# Patient Record
Sex: MALE | Race: WHITE | NOT HISPANIC OR LATINO | URBAN - METROPOLITAN AREA
[De-identification: names, ages, dates, MRNs, and addresses within clinical notes are randomized per-mention and may not be internally consistent; named-entity substitution may affect disease eponyms.]

---

## 2022-06-01 ENCOUNTER — TRANSFERRED RECORDS (OUTPATIENT)
Dept: HEALTH INFORMATION MANAGEMENT | Facility: CLINIC | Age: 52
End: 2022-06-01

## 2022-06-01 LAB
ALT SERPL-CCNC: 14 U/L (ref 6–55)
AST SERPL-CCNC: 11 U/L (ref 5–34)
INR (EXTERNAL): 1.12

## 2022-06-03 ENCOUNTER — TRANSFERRED RECORDS (OUTPATIENT)
Dept: HEALTH INFORMATION MANAGEMENT | Facility: CLINIC | Age: 52
End: 2022-06-03

## 2022-06-03 LAB
CREATININE (EXTERNAL): 1.05 MG/DL (ref 0.57–1.25)
GFR ESTIMATED (EXTERNAL): >60 ML/MIN/1.73M2
GFR ESTIMATED (IF AFRICAN AMERICAN) (EXTERNAL): >60 ML/MIN/1.73M2
GLUCOSE (EXTERNAL): 110 MG/DL (ref 70–105)
POTASSIUM (EXTERNAL): 4.2 MMOL/L (ref 3.5–5.1)

## 2023-02-12 ENCOUNTER — HEALTH MAINTENANCE LETTER (OUTPATIENT)
Age: 53
End: 2023-02-12

## 2023-02-14 ASSESSMENT — ENCOUNTER SYMPTOMS
LIGHT-HEADEDNESS: 0
JAUNDICE: 0
JOINT SWELLING: 0
HYPOTENSION: 0
BOWEL INCONTINENCE: 0
LEG PAIN: 0
BLOATING: 0
STIFFNESS: 1
PALPITATIONS: 0
PALPITATIONS: 0
POOR WOUND HEALING: 0
CONSTIPATION: 0
LIGHT-HEADEDNESS: 0
MYALGIAS: 0
NECK PAIN: 0
EXERCISE INTOLERANCE: 0
NECK PAIN: 0
BLOATING: 0
MUSCLE CRAMPS: 0
SYNCOPE: 0
LEG PAIN: 0
MUSCLE CRAMPS: 0
VOMITING: 0
ORTHOPNEA: 0
HEARTBURN: 1
BLOOD IN STOOL: 0
STIFFNESS: 1
JAUNDICE: 0
RECTAL PAIN: 0
EXERCISE INTOLERANCE: 0
NAIL CHANGES: 0
ARTHRALGIAS: 0
MYALGIAS: 0
DIARRHEA: 0
HYPERTENSION: 1
POOR WOUND HEALING: 0
BACK PAIN: 0
NECK PAIN: 0
SLEEP DISTURBANCES DUE TO BREATHING: 0
NAUSEA: 0
VOMITING: 0
MYALGIAS: 0
JOINT SWELLING: 0
BLOOD IN STOOL: 0
ORTHOPNEA: 0
ARTHRALGIAS: 0
NAUSEA: 0
LEG PAIN: 0
EXERCISE INTOLERANCE: 0
SLEEP DISTURBANCES DUE TO BREATHING: 0
DIARRHEA: 0
NAIL CHANGES: 0
SKIN CHANGES: 0
CONSTIPATION: 0
SYNCOPE: 0
ABDOMINAL PAIN: 0
LIGHT-HEADEDNESS: 0
BLOOD IN STOOL: 0
POOR WOUND HEALING: 0
SYNCOPE: 0
HYPOTENSION: 0
HEARTBURN: 1
BOWEL INCONTINENCE: 0
STIFFNESS: 1
JOINT SWELLING: 0
ABDOMINAL PAIN: 0
DIARRHEA: 0
SLEEP DISTURBANCES DUE TO BREATHING: 0
HEARTBURN: 1
ORTHOPNEA: 0
MUSCLE WEAKNESS: 0
HYPERTENSION: 1
JAUNDICE: 0
RECTAL PAIN: 0
BLOATING: 0
ABDOMINAL PAIN: 0
BOWEL INCONTINENCE: 0
MUSCLE CRAMPS: 0
NAUSEA: 0
HYPERTENSION: 1
HYPOTENSION: 0
CONSTIPATION: 0
BACK PAIN: 0
SKIN CHANGES: 0
MUSCLE WEAKNESS: 0
MUSCLE WEAKNESS: 0
SKIN CHANGES: 0
ARTHRALGIAS: 0
RECTAL PAIN: 0
VOMITING: 0
PALPITATIONS: 0
BACK PAIN: 0
NAIL CHANGES: 0

## 2023-02-14 ASSESSMENT — ANXIETY QUESTIONNAIRES
1. FEELING NERVOUS, ANXIOUS, OR ON EDGE: NOT AT ALL
7. FEELING AFRAID AS IF SOMETHING AWFUL MIGHT HAPPEN: NOT AT ALL
GAD7 TOTAL SCORE: 1
7. FEELING AFRAID AS IF SOMETHING AWFUL MIGHT HAPPEN: NOT AT ALL
2. NOT BEING ABLE TO STOP OR CONTROL WORRYING: NOT AT ALL
4. TROUBLE RELAXING: NOT AT ALL
IF YOU CHECKED OFF ANY PROBLEMS ON THIS QUESTIONNAIRE, HOW DIFFICULT HAVE THESE PROBLEMS MADE IT FOR YOU TO DO YOUR WORK, TAKE CARE OF THINGS AT HOME, OR GET ALONG WITH OTHER PEOPLE: NOT DIFFICULT AT ALL
6. BECOMING EASILY ANNOYED OR IRRITABLE: NOT AT ALL
GAD7 TOTAL SCORE: 1
2. NOT BEING ABLE TO STOP OR CONTROL WORRYING: NOT AT ALL
4. TROUBLE RELAXING: NOT AT ALL
6. BECOMING EASILY ANNOYED OR IRRITABLE: NOT AT ALL
5. BEING SO RESTLESS THAT IT IS HARD TO SIT STILL: NOT AT ALL
GAD7 TOTAL SCORE: 1
8. IF YOU CHECKED OFF ANY PROBLEMS, HOW DIFFICULT HAVE THESE MADE IT FOR YOU TO DO YOUR WORK, TAKE CARE OF THINGS AT HOME, OR GET ALONG WITH OTHER PEOPLE?: NOT DIFFICULT AT ALL
1. FEELING NERVOUS, ANXIOUS, OR ON EDGE: NOT AT ALL
8. IF YOU CHECKED OFF ANY PROBLEMS, HOW DIFFICULT HAVE THESE MADE IT FOR YOU TO DO YOUR WORK, TAKE CARE OF THINGS AT HOME, OR GET ALONG WITH OTHER PEOPLE?: NOT DIFFICULT AT ALL
3. WORRYING TOO MUCH ABOUT DIFFERENT THINGS: SEVERAL DAYS
7. FEELING AFRAID AS IF SOMETHING AWFUL MIGHT HAPPEN: NOT AT ALL
7. FEELING AFRAID AS IF SOMETHING AWFUL MIGHT HAPPEN: NOT AT ALL
IF YOU CHECKED OFF ANY PROBLEMS ON THIS QUESTIONNAIRE, HOW DIFFICULT HAVE THESE PROBLEMS MADE IT FOR YOU TO DO YOUR WORK, TAKE CARE OF THINGS AT HOME, OR GET ALONG WITH OTHER PEOPLE: NOT DIFFICULT AT ALL
5. BEING SO RESTLESS THAT IT IS HARD TO SIT STILL: NOT AT ALL
GAD7 TOTAL SCORE: 1
3. WORRYING TOO MUCH ABOUT DIFFERENT THINGS: SEVERAL DAYS

## 2023-02-16 ENCOUNTER — VIRTUAL VISIT (OUTPATIENT)
Dept: INTERNAL MEDICINE | Facility: CLINIC | Age: 53
End: 2023-02-16
Payer: COMMERCIAL

## 2023-02-16 DIAGNOSIS — Z00.00 ENCOUNTER FOR PREVENTATIVE ADULT HEALTH CARE EXAMINATION: Primary | ICD-10-CM

## 2023-02-16 PROCEDURE — 99207 PR NO CHARGE LOS: CPT

## 2023-02-16 RX ORDER — ALLOPURINOL 100 MG/1
100 TABLET ORAL DAILY
COMMUNITY

## 2023-02-16 NOTE — PROGRESS NOTES
Health Maintenance:  Do you have a PCP? No  When was your last visit with your PCP?   When was your last eye exam? 5/2022  Have you ever had a colonoscopy? No  If yes, when?   Have you ever had any polyps removed?     As part of your visit we will set up a DEXA scan which will measure your body composition. We have a few questions that need to be answered before we can schedule this scan:   What is your approximate weight? 240   Have you ever had a DEXA scan within the past 2 years? No   Will you have any other imaging studies with contrast (x-ray, CT scan) within 7 days of this appointment? No   Have you had any spine or hip surgery? No   Do you take any vitamins that contain calcium or antacids with calcium? No    If yes, stop taking 24 hours prior to visit.     Goals for the Visit:  1. Thorough Comprehensive Preventive Exam  2. BP concerns  3.   Pertinent past Medical/Family and Social HX:   Pertinent sx that desire are addressed with this visit:     Answers for HPI/ROS submitted by the patient on 2/14/2023  ELIZABETH 7 TOTAL SCORE: 1  General Symptoms: No  Skin Symptoms: Yes  HENT Symptoms: No  EYE SYMPTOMS: No  HEART SYMPTOMS: Yes  LUNG SYMPTOMS: No  INTESTINAL SYMPTOMS: Yes  URINARY SYMPTOMS: No  REPRODUCTIVE SYMPTOMS: No  SKELETAL SYMPTOMS: Yes  BLOOD SYMPTOMS: No  NERVOUS SYSTEM SYMPTOMS: No  MENTAL HEALTH SYMPTOMS: No  Changes in hair: No  Changes in moles/birth marks: No  Itching: Yes  Rashes: No  Changes in nails: No  Acne: No  Change in facial hair: No  Warts: No  Non-healing sores: No  Scarring: No  Flaking of skin: No  Color changes of hands/feet in cold : No  Sun sensitivity: No  Skin thickening: No  Chest pain or pressure: No  Fast or irregular heartbeat: No  Pain in legs with walking: No  Trouble breathing while lying down: No  Fingers or toes appear blue: No  High blood pressure: Yes  Low blood pressure: No  Fainting: No  Murmurs: No  Pacemaker: No  Varicose veins: No  Edema or swelling: No  Wake up at  night with shortness of breath: No  Light-headedness: No  Exercise intolerance: No  Heart burn or indigestion: Yes  Nausea: No  Vomiting: No  Abdominal pain: No  Bloating: No  Constipation: No  Diarrhea: No  Blood in stool: No  Black stools: No  Rectal or Anal pain: No  Fecal incontinence: No  Yellowing of skin or eyes: No  Vomit with blood: No  Change in stools: No  Back pain: No  Muscle aches: No  Neck pain: No  Swollen joints: No  Joint pain: No  Bone pain: No  Muscle cramps: No  Muscle weakness: No  Joint stiffness: Yes  Bone fracture: No        Instructions prior to appointment:   1. Fast beginning at 10 pm for lab appointment  2. If your preventive care assessment package includes a Fitness Assessment, please bring athletic shoes. Complementary [facility] fitness attire is provided and yours to keep.  3. If eye exam, eyes may be dilated, it will last 4-6 hours, may want to bring sunglasses.   4. May bring laptop or other work materials for use during downtime.   5. You will receive an email about 3 days prior to your visit with a final itinerary, menu selections for the complementary breakfast and lunch and instructions for the visit.     Complimentary  Parking provided. Drop off car in front of [facility], take the patient elevators to the [facility]. When you enter in the lobby, identify yourself as an [facility] [atient and you will be escorted up to the clinic.   If questions arise prior to your appointment please contact the clinic at [phone].

## 2023-02-20 ENCOUNTER — ANCILLARY PROCEDURE (OUTPATIENT)
Dept: BONE DENSITY | Facility: CLINIC | Age: 53
End: 2023-02-20

## 2023-02-20 ENCOUNTER — OFFICE VISIT (OUTPATIENT)
Dept: DERMATOLOGY | Facility: CLINIC | Age: 53
End: 2023-02-20

## 2023-02-20 ENCOUNTER — OFFICE VISIT (OUTPATIENT)
Dept: INTERNAL MEDICINE | Facility: CLINIC | Age: 53
End: 2023-02-20

## 2023-02-20 ENCOUNTER — OFFICE VISIT (OUTPATIENT)
Dept: GASTROENTEROLOGY | Facility: CLINIC | Age: 53
End: 2023-02-20

## 2023-02-20 ENCOUNTER — OFFICE VISIT (OUTPATIENT)
Dept: AUDIOLOGY | Facility: CLINIC | Age: 53
End: 2023-02-20

## 2023-02-20 ENCOUNTER — APPOINTMENT (OUTPATIENT)
Dept: INTERNAL MEDICINE | Facility: CLINIC | Age: 53
End: 2023-02-20

## 2023-02-20 ENCOUNTER — OFFICE VISIT (OUTPATIENT)
Dept: OPHTHALMOLOGY | Facility: CLINIC | Age: 53
End: 2023-02-20

## 2023-02-20 VITALS
HEIGHT: 76 IN | OXYGEN SATURATION: 97 % | SYSTOLIC BLOOD PRESSURE: 146 MMHG | DIASTOLIC BLOOD PRESSURE: 98 MMHG | RESPIRATION RATE: 16 BRPM | WEIGHT: 246 LBS | HEART RATE: 72 BPM | BODY MASS INDEX: 29.96 KG/M2 | TEMPERATURE: 98.4 F

## 2023-02-20 DIAGNOSIS — L73.8 SENILE SEBACEOUS GLAND HYPERPLASIA: ICD-10-CM

## 2023-02-20 DIAGNOSIS — H52.4 PRESBYOPIA: ICD-10-CM

## 2023-02-20 DIAGNOSIS — R07.9 CHEST PAIN, UNSPECIFIED TYPE: ICD-10-CM

## 2023-02-20 DIAGNOSIS — R82.90 ABNORMAL FINDING ON URINALYSIS: ICD-10-CM

## 2023-02-20 DIAGNOSIS — Z00.00 ENCOUNTER FOR PREVENTATIVE ADULT HEALTH CARE EXAMINATION: ICD-10-CM

## 2023-02-20 DIAGNOSIS — Z71.82 EXERCISE COUNSELING: Primary | ICD-10-CM

## 2023-02-20 DIAGNOSIS — E78.5 DYSLIPIDEMIA: ICD-10-CM

## 2023-02-20 DIAGNOSIS — R73.01 IMPAIRED FASTING GLUCOSE: ICD-10-CM

## 2023-02-20 DIAGNOSIS — R03.0 ELEVATED BLOOD PRESSURE READING IN OFFICE WITHOUT DIAGNOSIS OF HYPERTENSION: ICD-10-CM

## 2023-02-20 DIAGNOSIS — H52.13 MYOPIA OF BOTH EYES: Primary | ICD-10-CM

## 2023-02-20 DIAGNOSIS — E79.0 HYPERURICEMIA: ICD-10-CM

## 2023-02-20 DIAGNOSIS — D18.01 CHERRY ANGIOMA: Primary | ICD-10-CM

## 2023-02-20 DIAGNOSIS — J98.8 NARROWING OF AIRWAY: ICD-10-CM

## 2023-02-20 DIAGNOSIS — E66.3 OVERWEIGHT: ICD-10-CM

## 2023-02-20 DIAGNOSIS — Z00.00 ENCOUNTER FOR PREVENTATIVE ADULT HEALTH CARE EXAMINATION: Primary | ICD-10-CM

## 2023-02-20 DIAGNOSIS — G44.209 TENSION HEADACHE: ICD-10-CM

## 2023-02-20 DIAGNOSIS — L23.5 ALLERGIC DERMATITIS DUE TO OTHER CHEMICAL PRODUCT: ICD-10-CM

## 2023-02-20 LAB
ALBUMIN MFR UR ELPH: 13.8 MG/DL (ref 1–14)
ALBUMIN UR-MCNC: NEGATIVE MG/DL
ALP SERPL-CCNC: 77 U/L (ref 40–129)
ALT SERPL W P-5'-P-CCNC: 44 U/L (ref 10–50)
APPEARANCE UR: CLEAR
BASOPHILS # BLD AUTO: 0.1 10E3/UL (ref 0–0.2)
BASOPHILS NFR BLD AUTO: 1 %
BILIRUB UR QL STRIP: NEGATIVE
CHOLEST SERPL-MCNC: 205 MG/DL
COLOR UR AUTO: YELLOW
CREAT SERPL-MCNC: 1.25 MG/DL (ref 0.67–1.17)
CREAT UR-MCNC: 262 MG/DL
CYSTATIN C (ROCHE): 1.1 MG/L (ref 0.6–1)
DEPRECATED CALCIDIOL+CALCIFEROL SERPL-MC: 20 UG/L (ref 20–75)
EOSINOPHIL # BLD AUTO: 0.3 10E3/UL (ref 0–0.7)
EOSINOPHIL NFR BLD AUTO: 5 %
ERYTHROCYTE [DISTWIDTH] IN BLOOD BY AUTOMATED COUNT: 12.6 % (ref 10–15)
ERYTHROCYTE [SEDIMENTATION RATE] IN BLOOD BY WESTERGREN METHOD: 9 MM/HR (ref 0–20)
FASTING STATUS PATIENT QL REPORTED: YES
GFR SERPL CREATININE-BSD FRML MDRD: 70 ML/MIN/1.73M2
GFR SERPL CREATININE-BSD FRML MDRD: 71 ML/MIN/1.73M2
GLUCOSE SERPL-MCNC: 108 MG/DL (ref 70–99)
GLUCOSE UR STRIP-MCNC: NEGATIVE MG/DL
HCT VFR BLD AUTO: 44.5 % (ref 40–53)
HCV AB SERPL QL IA: NONREACTIVE
HDLC SERPL-MCNC: 51 MG/DL
HGB BLD-MCNC: 14.7 G/DL (ref 13.3–17.7)
HGB UR QL STRIP: ABNORMAL
HIV 1+2 AB+HIV1 P24 AG SERPL QL IA: NONREACTIVE
HOLD SPECIMEN: NORMAL
HOLD SPECIMEN: NORMAL
IMM GRANULOCYTES # BLD: 0 10E3/UL
IMM GRANULOCYTES NFR BLD: 0 %
KETONES UR STRIP-MCNC: NEGATIVE MG/DL
LDLC SERPL CALC-MCNC: 134 MG/DL
LEUKOCYTE ESTERASE UR QL STRIP: NEGATIVE
LYMPHOCYTES # BLD AUTO: 1.8 10E3/UL (ref 0.8–5.3)
LYMPHOCYTES NFR BLD AUTO: 28 %
MCH RBC QN AUTO: 29.5 PG (ref 26.5–33)
MCHC RBC AUTO-ENTMCNC: 33 G/DL (ref 31.5–36.5)
MCV RBC AUTO: 89 FL (ref 78–100)
MONOCYTES # BLD AUTO: 0.5 10E3/UL (ref 0–1.3)
MONOCYTES NFR BLD AUTO: 7 %
MUCOUS THREADS #/AREA URNS LPF: PRESENT /LPF
NEUTROPHILS # BLD AUTO: 3.9 10E3/UL (ref 1.6–8.3)
NEUTROPHILS NFR BLD AUTO: 59 %
NITRATE UR QL: NEGATIVE
NONHDLC SERPL-MCNC: 154 MG/DL
NRBC # BLD AUTO: 0 10E3/UL
NRBC BLD AUTO-RTO: 0 /100
PH UR STRIP: 5.5 [PH] (ref 5–7)
PLATELET # BLD AUTO: 248 10E3/UL (ref 150–450)
PROT/CREAT 24H UR: 0.05 MG/MG CR (ref 0–0.2)
PSA SERPL-MCNC: 0.65 NG/ML (ref 0–3.5)
RBC # BLD AUTO: 4.99 10E6/UL (ref 4.4–5.9)
RBC URINE: 2 /HPF
SP GR UR STRIP: 1.03 (ref 1–1.03)
TRIGL SERPL-MCNC: 102 MG/DL
TSH SERPL DL<=0.005 MIU/L-ACNC: 3.51 UIU/ML (ref 0.3–4.2)
URATE SERPL-MCNC: 9.5 MG/DL (ref 3.4–7)
UROBILINOGEN UR STRIP-MCNC: NORMAL MG/DL
WBC # BLD AUTO: 6.6 10E3/UL (ref 4–11)
WBC URINE: <1 /HPF

## 2023-02-20 PROCEDURE — 99207 PR NO CHARGE LOS: CPT

## 2023-02-20 PROCEDURE — 92550 TYMPANOMETRY & REFLEX THRESH: CPT | Performed by: AUDIOLOGIST

## 2023-02-20 PROCEDURE — 92015 DETERMINE REFRACTIVE STATE: CPT | Performed by: OPHTHALMOLOGY

## 2023-02-20 PROCEDURE — G0103 PSA SCREENING: HCPCS | Performed by: PATHOLOGY

## 2023-02-20 PROCEDURE — 96999 UNLISTED SPEC DERM SVC/PX: CPT | Performed by: INTERNAL MEDICINE

## 2023-02-20 PROCEDURE — 86803 HEPATITIS C AB TEST: CPT | Mod: 90 | Performed by: PATHOLOGY

## 2023-02-20 PROCEDURE — 84156 ASSAY OF PROTEIN URINE: CPT | Mod: 90 | Performed by: PATHOLOGY

## 2023-02-20 PROCEDURE — 99000 SPECIMEN HANDLING OFFICE-LAB: CPT | Performed by: PATHOLOGY

## 2023-02-20 PROCEDURE — 92004 COMPRE OPH EXAM NEW PT 1/>: CPT | Performed by: OPHTHALMOLOGY

## 2023-02-20 PROCEDURE — 87389 HIV-1 AG W/HIV-1&-2 AB AG IA: CPT | Mod: 90 | Performed by: PATHOLOGY

## 2023-02-20 PROCEDURE — 77080 DXA BONE DENSITY AXIAL: CPT | Performed by: INTERNAL MEDICINE

## 2023-02-20 PROCEDURE — 36415 COLL VENOUS BLD VENIPUNCTURE: CPT | Performed by: PATHOLOGY

## 2023-02-20 PROCEDURE — 94375 RESPIRATORY FLOW VOLUME LOOP: CPT | Performed by: INTERNAL MEDICINE

## 2023-02-20 PROCEDURE — 90471 IMMUNIZATION ADMIN: CPT | Performed by: INTERNAL MEDICINE

## 2023-02-20 PROCEDURE — 99207 PR NO BILLABLE SERVICE THIS VISIT: CPT | Performed by: DIETITIAN, REGISTERED

## 2023-02-20 PROCEDURE — 99203 OFFICE O/P NEW LOW 30 MIN: CPT | Performed by: DERMATOLOGY

## 2023-02-20 PROCEDURE — 84075 ASSAY ALKALINE PHOSPHATASE: CPT | Performed by: PATHOLOGY

## 2023-02-20 PROCEDURE — 82565 ASSAY OF CREATININE: CPT | Performed by: PATHOLOGY

## 2023-02-20 PROCEDURE — 81001 URINALYSIS AUTO W/SCOPE: CPT | Performed by: PATHOLOGY

## 2023-02-20 PROCEDURE — 84550 ASSAY OF BLOOD/URIC ACID: CPT | Mod: 90 | Performed by: PATHOLOGY

## 2023-02-20 PROCEDURE — 86706 HEP B SURFACE ANTIBODY: CPT | Mod: 90 | Performed by: PATHOLOGY

## 2023-02-20 PROCEDURE — 87340 HEPATITIS B SURFACE AG IA: CPT | Mod: 90 | Performed by: PATHOLOGY

## 2023-02-20 PROCEDURE — 84443 ASSAY THYROID STIM HORMONE: CPT | Performed by: PATHOLOGY

## 2023-02-20 PROCEDURE — 85025 COMPLETE CBC W/AUTO DIFF WBC: CPT | Performed by: PATHOLOGY

## 2023-02-20 PROCEDURE — 99386 PREV VISIT NEW AGE 40-64: CPT | Mod: 25 | Performed by: INTERNAL MEDICINE

## 2023-02-20 PROCEDURE — 85652 RBC SED RATE AUTOMATED: CPT | Performed by: PATHOLOGY

## 2023-02-20 PROCEDURE — 82306 VITAMIN D 25 HYDROXY: CPT | Mod: 90 | Performed by: PATHOLOGY

## 2023-02-20 PROCEDURE — 84460 ALANINE AMINO (ALT) (SGPT): CPT | Performed by: PATHOLOGY

## 2023-02-20 PROCEDURE — 82947 ASSAY GLUCOSE BLOOD QUANT: CPT | Performed by: PATHOLOGY

## 2023-02-20 PROCEDURE — 84520 ASSAY OF UREA NITROGEN: CPT | Mod: 90 | Performed by: PATHOLOGY

## 2023-02-20 PROCEDURE — 82610 CYSTATIN C: CPT | Mod: 90 | Performed by: PATHOLOGY

## 2023-02-20 PROCEDURE — 90715 TDAP VACCINE 7 YRS/> IM: CPT | Performed by: INTERNAL MEDICINE

## 2023-02-20 PROCEDURE — 93010 ELECTROCARDIOGRAM REPORT: CPT | Performed by: INTERNAL MEDICINE

## 2023-02-20 PROCEDURE — 92557 COMPREHENSIVE HEARING TEST: CPT | Performed by: AUDIOLOGIST

## 2023-02-20 PROCEDURE — 80061 LIPID PANEL: CPT | Performed by: PATHOLOGY

## 2023-02-20 RX ORDER — TRIAMCINOLONE ACETONIDE 1 MG/G
OINTMENT TOPICAL 2 TIMES DAILY
Qty: 80 G | Refills: 3 | Status: SHIPPED | OUTPATIENT
Start: 2023-02-20

## 2023-02-20 ASSESSMENT — REFRACTION_WEARINGRX
OD_AXIS: 107
OS_SPHERE: -0.75
SPECS_TYPE: SVL
OS_CYLINDER: SPHERE
OD_CYLINDER: +0.25
OD_SPHERE: -1.50

## 2023-02-20 ASSESSMENT — SLIT LAMP EXAM - LIDS
COMMENTS: NORMAL
COMMENTS: NORMAL

## 2023-02-20 ASSESSMENT — CONF VISUAL FIELD
METHOD: COUNTING FINGERS
OS_SUPERIOR_TEMPORAL_RESTRICTION: 0
OS_INFERIOR_TEMPORAL_RESTRICTION: 0
OS_SUPERIOR_NASAL_RESTRICTION: 0
OS_INFERIOR_NASAL_RESTRICTION: 0
OS_NORMAL: 1
OD_NORMAL: 1
OD_SUPERIOR_TEMPORAL_RESTRICTION: 0
OD_SUPERIOR_NASAL_RESTRICTION: 0
OD_INFERIOR_NASAL_RESTRICTION: 0
OD_INFERIOR_TEMPORAL_RESTRICTION: 0

## 2023-02-20 ASSESSMENT — CUP TO DISC RATIO
OS_RATIO: 0.2
OD_RATIO: 0.2

## 2023-02-20 ASSESSMENT — TONOMETRY
OS_IOP_MMHG: 14
OD_IOP_MMHG: 17
IOP_METHOD: TONOPEN

## 2023-02-20 ASSESSMENT — EXTERNAL EXAM - RIGHT EYE: OD_EXAM: NORMAL

## 2023-02-20 ASSESSMENT — REFRACTION_MANIFEST
OD_ADD: +1.75
OS_CYLINDER: SPHERE
OD_SPHERE: -1.75
OS_ADD: +1.75
OS_SPHERE: -1.00
OD_AXIS: 077
OD_CYLINDER: +0.50

## 2023-02-20 ASSESSMENT — VISUAL ACUITY
OS_CC+: -2
METHOD: SNELLEN - LINEAR
CORRECTION_TYPE: GLASSES
OD_CC: 20/20
OS_CC: 20/20

## 2023-02-20 ASSESSMENT — EXTERNAL EXAM - LEFT EYE: OS_EXAM: NORMAL

## 2023-02-20 ASSESSMENT — PAIN SCALES - GENERAL: PAINLEVEL: NO PAIN (0)

## 2023-02-20 NOTE — NURSING NOTE
Chief Complaint   Patient presents with     Physical     Patient is here for annual physical     Marta Hung CMA 7:15 AM on 2/20/2023.

## 2023-02-20 NOTE — PROGRESS NOTES
[facility] Dermatology Note    Encounter Date: Feb 20, 2023    Dermatology Problem List:  1. Suspected ACD, possibly to rubber components in gloves: triamcinolone, dermatoallergology referral  ______________________________________    Impression/Plan:  1. Suspected allergic contact dermatitis, possibly to rubber fingertips in gloves: will start with topicals. We discussed further diagnostic workup with patch testing and will refer to dermatoallergology.  - triamcinolone  - derm-allergy referral    2. Reassurance provided for benign lesions not treated today including cherry angiomata and sebaceous hyperplasia.        Follow-up in 1 year.       Staff Involved:  Staff Only    Adolfo Mckeon MD   of Dermatology  Department of Dermatology  [facility]      CC:   Chief Complaint   Patient presents with     Skin Check     FBSE.       History of Present Illness:  Mr. Zack Gleason is a 52 year old male who presents as a new patient.    Reaction to gloves - with rubber tips - some swelling of fingertip - fingers itch  - goes away after nancy hands    Labs:  N/A    Physical exam:  Vitals: There were no vitals taken for this visit.  GEN: This is a well developed, well-nourished male in no acute distress, in a pleasant mood.    SKIN: Stearns phototype II  - Full skin, which includes the head/face, both arms, chest, back, abdomen,both legs, buttocks, digits and/or nails, was examined.  - There are dome shaped bright red papules on the head/neck, trunk, extremities.   - There are yellow oily papules with central umbilication located on the face  - no erythema on the hands at present  - No other lesions of concern on areas examined.     Past Medical History:   Past Medical History:   Diagnosis Date     Anxiety approximately 5 years ago    I've had 2 panic attacks in the past.  The last one occurring about 5 years ago.     Gastroesophageal reflux  disease about 10 years ago    Recently had gall bladder taken out.  Haven't had acid reflux since.  Not sure if that's related or just a coincidence.     Hyperlipidemia 2021    Was put on medicine for 30 days, but never renewed the prescription.     Hypertension 4 years ago    Every time I go to a doctor's appointment I've been told my blood pressure is elevated. Never enough to put me on medicine though.     Past Surgical History:   Procedure Laterality Date     CHOLECYSTECTOMY  6/2022       Social History:   reports that he has never smoked. He has never used smokeless tobacco. He reports current alcohol use. He reports that he does not use drugs.    Family History:  Family History   Problem Relation Age of Onset     Influenza/Pneumonia Father      Heart Disease Maternal Grandfather      Dementia Paternal Grandmother      Cancer Paternal Grandfather      Lung Cancer Paternal Grandfather      Glaucoma No family hx of      Macular Degeneration No family hx of        Medications:  Current Outpatient Medications   Medication Sig Dispense Refill     allopurinol (ZYLOPRIM) 100 MG tablet Take 100 mg by mouth daily       No Known Allergies

## 2023-02-20 NOTE — LETTER
2/20/2023       RE: Zack Gleason  [address] [place] [address]     Dear Colleague,    Thank you for referring your patient, Zack Gleason, to the [facility] [place] at [facility]. Please see a copy of my visit note below.    [facility] Dermatology Note    Encounter Date: Feb 20, 2023    Dermatology Problem List:  1. Suspected ACD, possibly to rubber components in gloves: triamcinolone, dermatoallergology referral  ______________________________________    Impression/Plan:  1. Suspected allergic contact dermatitis, possibly to rubber fingertips in gloves: will start with topicals. We discussed further diagnostic workup with patch testing and will refer to dermatoallergology.  - triamcinolone  - derm-allergy referral    2. Reassurance provided for benign lesions not treated today including cherry angiomata and sebaceous hyperplasia.        Follow-up in 1 year.       Staff Involved:  Staff Only    Adolfo Mckeon MD   of Dermatology  Department of Dermatology  [facility]      CC:   Chief Complaint   Patient presents with     Skin Check     FBSE.       History of Present Illness:  Mr. Zack Gleason is a 52 year old male who presents as a new patient.    Reaction to gloves - with rubber tips - some swelling of fingertip - fingers itch  - goes away after nancy hands    Labs:  N/A    Physical exam:  Vitals: There were no vitals taken for this visit.  GEN: This is a well developed, well-nourished male in no acute distress, in a pleasant mood.    SKIN: Stearns phototype II  - Full skin, which includes the head/face, both arms, chest, back, abdomen,both legs, buttocks, digits and/or nails, was examined.  - There are dome shaped bright red papules on the head/neck, trunk, extremities.   - There are yellow oily papules with central umbilication located on the  face  - no erythema on the hands at present  - No other lesions of concern on areas examined.     Past Medical History:   Past Medical History:   Diagnosis Date     Anxiety approximately 5 years ago    I've had 2 panic attacks in the past.  The last one occurring about 5 years ago.     Gastroesophageal reflux disease about 10 years ago    Recently had gall bladder taken out.  Haven't had acid reflux since.  Not sure if that's related or just a coincidence.     Hyperlipidemia 2021    Was put on medicine for 30 days, but never renewed the prescription.     Hypertension 4 years ago    Every time I go to a doctor's appointment I've been told my blood pressure is elevated. Never enough to put me on medicine though.     Past Surgical History:   Procedure Laterality Date     CHOLECYSTECTOMY  6/2022       Social History:   reports that he has never smoked. He has never used smokeless tobacco. He reports current alcohol use. He reports that he does not use drugs.    Family History:  Family History   Problem Relation Age of Onset     Influenza/Pneumonia Father      Heart Disease Maternal Grandfather      Dementia Paternal Grandmother      Cancer Paternal Grandfather      Lung Cancer Paternal Grandfather      Glaucoma No family hx of      Macular Degeneration No family hx of        Medications:  Current Outpatient Medications   Medication Sig Dispense Refill     allopurinol (ZYLOPRIM) 100 MG tablet Take 100 mg by mouth daily       No Known Allergies

## 2023-02-20 NOTE — NURSING NOTE
Dermatology Rooming Note    Zack Gleason's goals for this visit include:   Chief Complaint   Patient presents with     Skin Check     FBSE.     Harshad Singer, BENY-B

## 2023-02-20 NOTE — PROGRESS NOTES
Zack DEANDRE Gleason comes into clinic today at the request of Dr. OMAR Sotomayor Ordering Provider for EKG.    This service provided today was under the supervising provider of the day Dr. OMAR Sotomayor, who was available if needed.    Marta Hung, [facility]

## 2023-02-20 NOTE — PATIENT INSTRUCTIONS
It was nice meeting you today. Below are the nutrition recommendations we discussed at your visit.    Please let me know if you have any additional questions.    Nutrition Recommendations    1. Can use the Plate method plan for general guidance on getting balanced meals and general portion sizes which is as follows:   Make half of your plate vegetables and fruit. (Aim for eating at least 1-2 cups of vegetables at meals at least and can have additional vegetables servings and fruit too).     Make 1/4 of your plate lean protein sources at a meal (salmon/fish, skinless chicken/turkey breast, pork loin, lean cuts of beef, black or kidney or ladd beans, tofu, edamame, tempeh, eggs, egg whites, lowfat cottage cheese, lowfat yogurt).     Make the other 1/4 of your plate whole grain starches/grains/starchy vegetables at meals. Some examples include quinoa, brown rice, wild rice, barley, whole grain tortilla or starchy vegetables (potatoes, sweet potatoes, winter squash, peas, corn).     Include some healthy/unsaturated fat servings at a meal (avocados, nut butters, nuts/seeds, olive oil, vegetable oils, flaxseed, rosa seeds).     *Note: Recommendation is to eat at least 2-3 serving per day of some good sources of calcium (such as lowfat cottage cheese, lowfat yogurt, lowfat milk, tofu, salmon, sardines, kale, spinach, soybeans, almonds or whey protein powder for example).    2. Planning out meals ahead of time and preparing some meals ahead of time can help you stick to eating more balanced meals. (can wash and cut up vegetable and store in the refrigerator to easily steam/roast or microwave them to add to dinner meal. Can also heat up frozen vegetables or have a ready washed salad mix to have along with your dinner/pasta meals or add vegetables into your pasta sauce along with some protein. (can make extra servings of chicken or fish to have along with pasta.    3. Drink at least 48-64 oz (6-8 cups) unsweetened fluids per  day. Can switch to propel zero, water, caffeine free herbal tea.    4.  Instead of sweetened creamer in coffee, can try adding a sugar free protein drink such as Premier protein drink or Pure protein or Ensure Max into coffee as your creamer. Can also replace a meal with a protein drink and some fruit and vegetables instead of skipping a meal such as lunch (or breakfast. If having a protein drink in your coffee, this can be a meal replacement too).    5. Keep daily food and beverage journals to help give you a daily picture of what you are eating and drinking daily. Journals can be very helpful while trying to lose weight. Can use an julio such as [employer] Fitness Pal or Lose It or can keep in a notebook if you prefer.    6. At night, can have some caffeine free herbal tea or 1/2-1 protein drink (such as premier protein or pure protein or any sugar free/low sugar, lower calorie protein drink to see if this helps limit snacking at night.    Thank you,    Bridget Contreras, MS, RD, LD

## 2023-02-20 NOTE — PROGRESS NOTES
Chief Complaints and History of Present Illnesses   Patient presents with     COMPREHENSIVE EYE EXAM     Chief Complaint(s) and History of Present Illness(es)     COMPREHENSIVE EYE EXAM    In both eyes.  Since onset it is gradually worsening.  Associated symptoms   include Negative for dryness, eye pain, flashes and floaters.  Treatments   tried include no treatments.  Pain was noted as 0/10.           Comments    He states that his distance vision has been a bit fuzzy since his last   exam.  HE struggles a bit to read print on TV.  He takes his glasses off   to read.      Katya Umana, COT 9:47 AM  February 20, 2023                   Assessment & Plan     Zack Gleason is a 52 year old male with the following diagnoses:   1. Myopia of both eyes    2. Presbyopia       MANIFEST REFRACTION = Rx   Return to clinic 1 year              Attending Physician Attestation:  Complete documentation of historical and exam elements from today's encounter can be found in the full encounter summary report (not reduplicated in this progress note).  I personally obtained the chief complaint(s) and history of present illness.  I confirmed and edited as necessary the review of systems, past medical/surgical history, family history, social history, and examination findings as documented by others; and I examined the patient myself.  I personally reviewed the relevant tests, images, and reports as documented above.  I formulated and edited as necessary the assessment and plan and discussed the findings and management plan with the patient and family.   -Tristen Lowry MD  10:36 AM 2/20/2023

## 2023-02-20 NOTE — LETTER
"    2/20/2023         RE: Zack Gleason  [address] [place] [address]        Dear Colleague,    Thank you for referring your patient, Zack Gleason, to the [facility] [place]. Please see a copy of my visit note below.    [facility] Outpatient Medical Nutrition Therapy      Time Spent:  60 minutes as part of a couple's visit  Session Type:  Initial   Referral Source: [facility] [facility]/Dr. Malachi Sotomayor  Reason for RD Visit:   Nutritional counseling     Nutrition Assessment:     Patient is a 52 y.o. male who is here for initial annual visit with Registered Dietitian (RD).    PMhx includes: hyperlipidemia, hypertension, anxiety and GERD.    He stated that he tends to skip breakfast and just drinks coffee with flavored sweetened creamer, then eats lunch and dinner. May just have some kind bars for lunch. They have a 14 y.o son who plays basketball, so are busy and need to eats some easy to prepare dinner meals which tends to be pasta and pizza many times. They make homemade tomato sauce but don't consistently have vegetables or protein along with this meal. Recently he has been making some homemade soup and chili. Sometimes they eat chicken, fish or shrimp meal. He likes burger and steak. In summer will grill meats, fish and veggies more. He does not drink water, typically has a few bottles of propel drinks (believes they are sweetened not zero options). He rarely drinks alcohol, only has a glass of wine, 2x/month. He doesn't do any structured exercise currently. Plays basketball once per week with his son.    Estimated body mass index is 29.94 kg/m  as calculated from the following:    Height as of an earlier encounter on 2/20/23: 1.93 m (6' 4\").    Weight as of an earlier encounter on 2/20/23: 111.6 kg (246 lb).    Diet Recall:  (some usual meals, snacks and beverages)  Meal Food    Breakfast skips   Lunch 2 kind bars or pasta or burger with fries or steak " with fries   Dinner Pasta with homemade tomato sauce or pizza or sub or chicken/fish/shrimp with green beans/judd/brus sprouts or homemade soup or chili   Snacks 2 kinds bars per day (may be his lunch), flipz greek yogurt. In evening chips/cookies, Grady pop or ramen noodles   Beverages Coffee with flavored creamer, 3 bottles propel (believes regular/sweetened option). Does not drink water   Alcohol Intake Rarely. About 2x/month will have a glass of wine     Frequency of eating/taking out meals: about 3-4 times per week (1x/week eats out, 2x/week door dash delivered and 2x/month takes out for lunch)     Labs:  On 2/20/23: LDL chol: 134, non HDL judith 154, chol 205.  Last Comprehensive Metabolic Panel:  Glucose   Date Value Ref Range Status   02/20/2023 108 (H) 70 - 99 mg/dL Final     Creatinine   Date Value Ref Range Status   02/20/2023 1.25 (H) 0.67 - 1.17 mg/dL Final     GFR Estimate   Date Value Ref Range Status   02/20/2023 70 >60 mL/min/1.73m2 Final     Comment:     eGFR calculated using 2021 CKD-EPI equation.       CBC RESULTS:   Recent Labs   Lab Test 02/20/23  0748   WBC 6.6   RBC 4.99   HGB 14.7   HCT 44.5   MCV 89   MCH 29.5   MCHC 33.0   RDW 12.6          Pertinent Medications/vitamin and mineral supplements:     Current Outpatient Medications   Medication     allopurinol (ZYLOPRIM) 100 MG tablet     No current facility-administered medications for this visit.       Food Allergies:  NKFA    Physical Activity:  No structured exercise. Plays basketball once per week with son.    Nutrition Prescription: Recommended general healthful diet     Nutrition Intervention:    Nutrition Education/Counseling:  -Provided general healthful diet nutrition education with tips and suggestions.   -Reviewed the Plate method meal plan with food groups and some example foods in groups.   -Discussed general sodium recommendations of getting less than 2300 mg/day. Discussed some low sodium diet tips.   -Reviewed the  difference between unsaturated and saturated fats with recommendation to aim for choosing unsaturated fat over saturated fats and discussed examples of both.  -Encouraged patient to eat adequate fruit and vegetable servings per day (at least 5 servings but explained recommendation to aim for 9-11 servings per day).   -Discussed adequate hydration/recommendations and encouraged pt to drink at least 48 oz-64 oz (6-8 cups) per day. Told pt okay add lemon/lime to water or can flavor with cucumber slice or fresh herbs/fruit for example to naturally flavor water.     Answered patient's questions. Patient verbalized understanding of education provided. Provided pt with RD contact information and list of goals below.     Educational Materials Provided:  [employer] Daily Nutrition Plate method handout     Goals:    1. Can use the Plate method plan for general guidance on getting balanced meals and general portion sizes which is as follows:   Make half of your plate vegetables and fruit. (Aim for eating at least 1-2 cups of vegetables at meals at least and can have additional vegetables servings and fruit too).     Make 1/4 of your plate lean protein sources at a meal (salmon/fish, skinless chicken/turkey breast, pork loin, lean cuts of beef, black or kidney or ladd beans, tofu, edamame, tempeh, eggs, egg whites, lowfat cottage cheese, lowfat yogurt).     Make the other 1/4 of your plate whole grain starches/grains/starchy vegetables at meals. Some examples include quinoa, brown rice, wild rice, barley, whole grain tortilla or starchy vegetables (potatoes, sweet potatoes, winter squash, peas, corn).     Include some healthy/unsaturated fat servings at a meal (avocados, nut butters, nuts/seeds, olive oil, vegetable oils, flaxseed, rosa seeds).     *Note: Recommendation is to eat at least 2-3 serving per day of some good sources of calcium (such as lowfat cottage cheese, lowfat yogurt, lowfat milk, tofu, salmon, sardines, kale,  spinach, soybeans, almonds or whey protein powder for example).    2. Planning out meals ahead of time and preparing some meals ahead of time can help you stick to eating more balanced meals. (can wash and cut up vegetable and store in the refrigerator to easily steam/roast or microwave them to add to dinner meal. Can also heat up frozen vegetables or have a ready washed salad mix to have along with your dinner/pasta meals or add vegetables into your pasta sauce along with some protein. (can make extra servings of chicken or fish to have along with pasta.    3. Drink at least 48-64 oz (6-8 cups) unsweetened fluids per day. Can switch to propel zero, water, caffeine free herbal tea.    4.  Instead of sweetened creamer in coffee, can try adding a sugar free protein drink such as Premier protein drink or Pure protein or Ensure Max into coffee as your creamer. Can also replace a meal with a protein drink and some fruit and vegetables instead of skipping a meal such as lunch (or breakfast. If having a protein drink in your coffee, this can be a meal replacement too).    5. Keep daily food and beverage journals to help give you a daily picture of what you are eating and drinking daily. Journals can be very helpful while trying to lose weight. Can use an julio such as My Fitness Pal or Lose It or can keep in a notebook if you prefer.    6. At night, can have some caffeine free herbal tea or 1/2-1 protein drink (such as premier protein or pure protein or any sugar free/low sugar, lower calorie protein drink to see if this helps limit snacking at night.    Nutrition Monitoring and Evaluation: Will monitor adherence to nutrition recommendations at future RD visits.     Further Medical Nutrition Therapy:  Annual visits    Patient was encouraged to call/contact RD with any further questions.        Bridget Contreras, MS, RD, LD

## 2023-02-20 NOTE — NURSING NOTE
Chief Complaints and History of Present Illnesses   Patient presents with     COMPREHENSIVE EYE EXAM     Chief Complaint(s) and History of Present Illness(es)     COMPREHENSIVE EYE EXAM            Laterality: both eyes    Course: gradually worsening    Associated symptoms: Negative for dryness, eye pain, flashes and floaters    Treatments tried: no treatments    Pain scale: 0/10          Comments    He states that his distance vision has been a bit fuzzy since his last exam.  HE struggles a bit to read print on TV.  He takes his glasses off to read.      Katya Umana, COT 9:47 AM  February 20, 2023

## 2023-02-20 NOTE — PATIENT INSTRUCTIONS
"It was great to meet you at [facility]. My primary recommendation is to add cardiovascular exercise to your routine. Your VO2max is very low, and you will live longer and better if you improve it. Please see my specific recommendations below.    Cardiovascular exercise    In short, anything you add will likely improve your fitness, and rather quickly. Please see the recommendations below if you want to use heart rate to guide your cardiovascular exercise.    \"Easy\" cardio:  Modify intensity to achieve a heart rate of  (this will increase as your fitness improves).  Peloton zone 2/7.  Do this for as long as you want to or have time for. Your body can tolerate quite a lot of cardio exercise at this intensity.  These workouts may feel like you're not \"getting a workout.\" That's okay because they are supposed to be easy.    \"Threshold or Tempo\" workouts:  Exercise at heart rates between 105-145. This exercise will be somewhat challenging, especially towards the higher end of this range.  Do threshold workouts for 15-30 minutes.  Peloton zones 3/7 and 4/7.    \"VO2max interval workouts:  Find Peloton workouts that feature lots of zones 5 or higher out of 7.    Functional Threshold Power (FTP) testing    I recommend you find your FTP with Peloton. This intensity will be close to your upper threshold heart rate of 145 but may be slightly different given that you'll be using a bike. You don't need to do FTP workouts, but I recommend you re-test your FTP about once every 1-2 months to have another way to track your progress.    Choosing which kind of cardio  Do what you enjoy, and do something rather than nothing, even if it's not the \"ideal\" workout. For example, if you love intervals and won't do moderate, steady cardio, do intervals instead of skipping a workout.  Strictly from a physiological perspective, lean towards more intense workouts if you can only do 1-2 workouts per week. For example, \"threshold\" or " "interval workouts.  Perform various kinds of cardio workouts, including intentionally easy workouts.  Generally, 1-2 \"hard\" workouts (e.g., intervals, threshold) per week are enough. The rest of your workouts should ideally be \"easy.\"    Forming a new exercise habit    I recommend a different way of forming and maintaining a new exercise habit. This method has three components.    Start tiny. Your \"workout\" could be as little as one minute on the Peloton. You can always do more but start with a low bar. The reason for setting your bar so low is that easier behaviors require less required motivation. Especially in the beginning, these tiny \"workouts\" are more about the habit than the fitness benefits.    Give yourself authentic praise for these small workouts. Doing so may seem silly, pointless, and akin to a participation award. However, it is critical to authentically congratulate yourself for doing something that, at first glance, may not feel deserving of praise. You can literally pat yourself on the back, give yourself a big smile, or do anything else that makes you feel good about completing such a small workout. This is key because positive emotions are the best way to reinforce habits (good or bad).    Insert your new habit alongside specific, preexisting habits. For example, doing a workout  after work  sounds intuitive but is often too vague. Instead, doing a workout after washing dishes after dinner is better. You will begin associating your exercise habit with your other preexisting habits, and the new behavior will become automatic.    If you have questions, please reach out.    Sincerely,    Minor Barnhart MS, Exercise Physiology    "

## 2023-02-20 NOTE — PROGRESS NOTES
AUDIOLOGY REPORT  [facility]      SUMMARY: Audiology visit completed. See audiogram for results.       RECOMMENDATIONS: Recheck hearing if changes are noted or concerns arise. Follow-up with primary care regarding vertigo concerns.      Supa Mcgowan. CCC-A  Licensed Audiologist   [place] #[id number]

## 2023-02-20 NOTE — LETTER
2023     RE: Zack Gleason  [address] [facility] [address]     Dear Colleague,    Thank you for referring your patient, Zack Gleason, to the [facility] [place] at [facility]. Please see a copy of my visit note below.     History and Physical Examination     SUBJECTIVE: Chief concern: preventive health review.     Past Medical History:  1.  History of gout.  2.  Status post laparoscopic cholecystectomy, 2022  3.  History of elevated blood pressure without diagnosis of or treatment for hypertension.  4.  Status post vasectomy,   5.  History of anxiety with panic attack, circa .  6.  GERD  7.  History of vertigo ×2, most recently in 2021; initial evaluation 2013 apparently suggestive of BPPV     Adverse Drug Reactions: None.     Current Medications:  None;  prescriptions for allopurinol, 100 mg per day and apparently colchicine, dose not clear, used as needed     Habits:  Tobacco: Never  Alcohol: 0-1 serving a week  Caffeine: 2 servings of coffee per day  Street drugs: None     Social History:  to gel and father of 4 children: daughter Qiana, age 26, a [employer] graduate who lives in [place], where she works as a  for [employer]; son Zack, age 23, a [employer] graduate who works for the [employer] in [place]; daughter, Aline, age 21, a senior at [employer] in [place]; and son Timmy, age 14, who enjoys playing baseball.  Hermes is a native of [place] who attended the [employer] on a football scholarship; he played quarterback and completed a degree in English.  Since graduation, Hermes has worked in the [employer] industry, initially in [place], for the past 16 years in [place], and with [employer] in the [place] over the past 6 months.  Away from work, he enjoys spectator sports, especially following his son's baseball  team.  He exercises infrequently and sporadically, typically playing basketball or whiffle ball with his son.    Family History: Father is 83, with history of several bouts of pneumonia.  Mother is overweight at age 79.  A sisters 8- and 6 years his senior are in good health.  A brother 5 years his senior has a history of anxiety and gout.  Children are in good health.  Maternal grandmother  in her 90s.  Maternal grandfather  from complications of coronary artery bypass at age 75.  Paternal grandmother  at age 85, with history of dementia, diagnosed at an advanced age, and depression.  Paternal grandfather  from tobacco-related lung cancer at age 75.    Review of Systems: Intermittent heartburn, improved since cholecystectomy; symptoms are more likely after extended fasting, consumption of spicy foods or when lying down, but at times occur without recalled trigger.  He denies clear association with exertion and notes no associated dyspnea and diaphoresis.  Infrequent episodes of palpitations, typically self-limited and noted during periods of stress.  Intermittent headaches, varying from unilateral retro-orbital to bitemporal to occipital locations.  He: He denies jaw claudication.  Occasional snoring, without  daytime somnolence or known apnea.  History of recurrent flares of itching with use of rubber gloves, triggering diffuse arthralgias; lip redness and swelling after consumption of images from plastic bottles; allergy clinic consultation recommended earlier in day following discussion of these phenomena with his dermatologist.  No history of colonoscopy.  He believes his last tetanus booster was administered more than 10 years ago.  He believes he may have received a hepatitis B vaccination series in the past but cannot be certain.  He received 2 Covid vaccinations, followed by 1 Covid vaccination booster.  No history of zoster vaccination.  Remainder of complete review of systems was  "negative.    OBJECTIVE:     Vital signs: Height 76 inches.  Weight 246 pounds.  Blood pressure 142/97 on average of 3 automated readings.  Heart rate 72.  Respiratory rate 16.  Temperature 98.4 degrees.  O2 saturation 97% on room air.  General: Alert, neatly dressed and groomed, in no acute distress.  HEENT: Atraumatic and normocephalic. Eyelids, pupils, and conjunctivae appeared normal. Lips, teeth and gums appear normal.  Oropharynx showed moist mucous membranes, without exudate or erythema.  \"Crowded\" soft palate with narrow airway.  Neck: Supple, without thyromegaly, mass, or bruit. No cervical or supraclavicular lymphadenopathy.  Large neck circumference.  Back: No spinal or costovertebral angle tenderness.  Chest: Clear to auscultation and percussion. Normal respiratory effort.  Cardiovascular: No jugular venous distention. Regular rate and rhythm, normal S1, S2 without murmur.  Abdomen: Bowel sounds positive; soft, nontender, without rebound, guarding, hepatosplenomegaly or mass.  Extremities: No cyanosis or edema.  Genitalia: Normal male genitalia, without scrotal mass or hernia. No inguinal lymphadenopathy.  Rectal: Normal tone, with smooth, nontender, nonenlarged prostate. No rectal mass.  Skin: Examination was deferred; full evaluation was completed earlier in day through dermatology clinic.  Neurologic: Cranial nerves II-XII were grossly intact. Sensory and motor examinations were normal. Normal gait.  Mini-cog score was 4/5; 5/5 with minimal prompting.  Psychiatric: Alert and oriented ×3. Normal affect. Judgment and insight intact.  PHQ-2 score was 0.  ELIZABETH-7 score was 1.    Creatinine 1.25 (normal range 0.67-1.17), estimated GFR based on creatinine 70, cystatin C 1.1 (normal range 0.67 1.0), GFR Related with cystatin C 71, alkaline phosphatase 77, ALT 44, cholesterol 205, HDL 51, , triglycerides 102, cholesterol/HDL 2.0, glucose 108, PSA 0.65, TSH 3.51, 25-hydroxy vitamin D 20, white blood cell " count 6600, hemoglobin 14.7, platelets 248,000, HIV and hepatitis C range nonreactive, urinalysis notable only for small blood, with <1 white blood cell and 2, red blood cells per high-powered field; otherwise, unremarkable.    EKG was unremarkable.  Spirometry showed an FEV1 of 3.47, with an FVC of 4.14; readings were 78% and 73% of predicted values, respectively.    Audiogram showed normal hearing bilaterally.    Preliminary DEXA results showed normal bone density.  Body composition analysis showed 41.9% fat (100th percentile); body mass index was 29.9.     ASSESSMENT:    1.  Impaired fasting glucose.  We discussed the implications of pre-diabetes, along with importance of weight loss, regular exercise, and modification of diet.  He will be advised of recommended type and frequency of monitoring.    2.  Elevated creatinine level.  GFR is normal measured with both creatinine and cystatin C.  He is a large man but does not exercise regularly to suggest a false positive reading.  We will check BUN and urine albumin/creatinine ratio.  If these results suggest kidney disease, I will suggest initiating treatment for elevated blood pressure.    3.  Elevated blood pressure.  Home readings are not available.  We reviewed non-pharmacologic measures to reduce blood pressure, which I encouraged him to follow.  He will measure and record home blood pressure readings for review with his primary physician 1 month; he agrees to contact me in the event of average readings exceeding 150 systolic or 95 diastolic before his primary M.D. follow-up.    4.  Chest discomfort.  Suggestive of GERD.  In the setting of a history of palpitations, and given his marked deconditioning and sedentary lifestyle, we will proceed with stress echocardiogram to exclude ischemia in anticipation of higher levels of activity.    5.  Abnormal spirometry.  No active respiratory symptoms.  FVC is likely depressed as a result of overweight.  He will work on  measures to facilitate weight loss and plan to arrange repeat spirometry in the event of rest her symptoms, or after weight loss.    6.  Headaches.  No temporal artery tenderness on examination.  No sinus symptoms to suggest infection.  We will check ESR to exclude temporal arteritis.  If negative,  I will recommend that he maintain a symptom journal and consider a trial of acetaminophen/aspirin/caffeine as needed.  We reviewed the importance of screening for obstructive sleep apnea (see below).      7.  Narrow airway with history of snoring and elevated blood pressure.  The scenario suspicious for obstructive sleep apnea.  He denies daytime somnolence, unrefreshed sensation after sleeping, and morning headaches.  We did ask his wife to monitor his breathing during sleep; he will schedule sleep clinic consultation if she observes loud snoring, snorting, or pauses in breathing.    8.  Abnormal urinalysis.  He will repeat a urinalysis after 1 week; if blood remains, he will submit a third urinalysis after at least 1 additional week; further evaluation will be arranged if each of 3 specimens shows evidence of blood.    9.  Overweight.  Associated with high body fat percentage.  We reviewed diet and exercise strategies for facilitating weight loss, building muscle mass, and reducing body fat.    10.  History of gout.  Uric acid level requested; we discussed the potential role of hyperuricemia in kidney disease.  Further recommendations will be based on this result.    11.  Preventive care.  Printed material was provided regarding basic stress management strategies.  I emphasized the importance of scheduling colonoscopy at his earliest convenience.  He was advised to the recombinant zoster vaccination series.  Because he is unsure whether he previously received hepatitis B vaccination series.  We will check serology before proceeding with vaccination.  I emphasized the importance of continuing to receive Covid  vaccination boosters.  We reviewed the elements of a healthful diet and the roles of various types of exercise, including the muscle-building benefits of strength training and the fat-burning effect of subthreshold exercise, along with the time economy associated with high-intensity interval training.     PLAN: See above.     ~SRT  Again, thank you for allowing me to participate in the care of your patient.      Sincerely,    Malachi Sotomayor MD

## 2023-02-20 NOTE — NURSING NOTE
AHA BP    1st   138/98  2nd  141/96  3rd   146/98    Average  142/97  Marta Hung CMA 1:11 PM on 2/20/2023

## 2023-02-20 NOTE — OUTPATIENT NURSE NOTE
02/20/23 0941   Fitness   Current Fitness Regimen:  Exercise: occational light baseball, basketball with son. Physical activity: sendentary to light at work and home.   Fitness Goals Establish baseline, improve cardiovascular fitness.   Timeline   Recommended Activity this Week Test different routines to add Peloton cycling   Recommended Minutes per Day this Week 10 Min   Recommended Number of Days this Week 3 Per Day/Per Week   Recommended Activity this Month As above, but increase duration and frequency of cardiovascular exercise.   Recommended Duration this Month 20 Min/Hrs   Recommended Frequency this Month 4 Per Day/Per Week   Recommended Activity the Nex 3 Months Further increase duration and frequency of cardiovascular exercise   Recommended Duration the Nex 3 Months 30 Min/Hrs   Recommended Frequency the Nex 3 Months:  5 Per Day/Per Week   VO2 Max   VO2MAX:  26.5 ml/kg/min   VO2- max Percentile 1 %   Fitness Level Very Poor    Strength    Strength (Right):  119.9 ml/kg/min    Strength (Left) 125 ml/kg/min

## 2023-02-20 NOTE — PROGRESS NOTES
"[facility] Outpatient Medical Nutrition Therapy      Time Spent:  60 minutes as part of a couple's visit  Session Type:  Initial   Referral Source: [facility] Package/Dr. Malachi Sotomayor  Reason for RD Visit:   Nutritional counseling     Nutrition Assessment:     Patient is a 52 y.o. male who is here for initial annual visit with Registered Dietitian (RD).    PMhx includes: hyperlipidemia, hypertension, anxiety and GERD.    He stated that he tends to skip breakfast and just drinks coffee with flavored sweetened creamer, then eats lunch and dinner. May just have some kind bars for lunch. They have a 14 y.o son who plays basketball, so are busy and need to eats some easy to prepare dinner meals which tends to be pasta and pizza many times. They make homemade tomato sauce but don't consistently have vegetables or protein along with this meal. Recently he has been making some homemade soup and chili. Sometimes they eat chicken, fish or shrimp meal. He likes burger and steak. In summer will grill meats, fish and veggies more. He does not drink water, typically has a few bottles of propel drinks (believes they are sweetened not zero options). He rarely drinks alcohol, only has a glass of wine, 2x/month. He doesn't do any structured exercise currently. Plays basketball once per week with his son.    Estimated body mass index is 29.94 kg/m  as calculated from the following:    Height as of an earlier encounter on 2/20/23: 1.93 m (6' 4\").    Weight as of an earlier encounter on 2/20/23: 111.6 kg (246 lb).    Diet Recall:  (some usual meals, snacks and beverages)  Meal Food    Breakfast skips   Lunch 2 kind bars or pasta or burger with fries or steak with fries   Dinner Pasta with homemade tomato sauce or pizza or sub or chicken/fish/shrimp with green beans/judd/brus sprouts or homemade soup or chili   Snacks 2 kinds bars per day (may be his lunch), flipz greek yogurt. In evening chips/cookies, Grady pop or ramen " noodles   Beverages Coffee with flavored creamer, 3 bottles propel (believes regular/sweetened option). Does not drink water   Alcohol Intake Rarely. About 2x/month will have a glass of wine     Frequency of eating/taking out meals: about 3-4 times per week (1x/week eats out, 2x/week door dash delivered and 2x/month takes out for lunch)     Labs:  On 2/20/23: LDL chol: 134, non HDL judith 154, chol 205.  Last Comprehensive Metabolic Panel:  Glucose   Date Value Ref Range Status   02/20/2023 108 (H) 70 - 99 mg/dL Final     Creatinine   Date Value Ref Range Status   02/20/2023 1.25 (H) 0.67 - 1.17 mg/dL Final     GFR Estimate   Date Value Ref Range Status   02/20/2023 70 >60 mL/min/1.73m2 Final     Comment:     eGFR calculated using 2021 CKD-EPI equation.       CBC RESULTS:   Recent Labs   Lab Test 02/20/23  0748   WBC 6.6   RBC 4.99   HGB 14.7   HCT 44.5   MCV 89   MCH 29.5   MCHC 33.0   RDW 12.6          Pertinent Medications/vitamin and mineral supplements:     Current Outpatient Medications   Medication     allopurinol (ZYLOPRIM) 100 MG tablet     No current facility-administered medications for this visit.       Food Allergies:  NKFA    Physical Activity:  No structured exercise. Plays basketball once per week with son.    Nutrition Prescription: Recommended general healthful diet     Nutrition Intervention:    Nutrition Education/Counseling:  -Provided general healthful diet nutrition education with tips and suggestions.   -Reviewed the Plate method meal plan with food groups and some example foods in groups.   -Discussed general sodium recommendations of getting less than 2300 mg/day. Discussed some low sodium diet tips.   -Reviewed the difference between unsaturated and saturated fats with recommendation to aim for choosing unsaturated fat over saturated fats and discussed examples of both.  -Encouraged patient to eat adequate fruit and vegetable servings per day (at least 5 servings but explained  recommendation to aim for 9-11 servings per day).   -Discussed adequate hydration/recommendations and encouraged pt to drink at least 48 oz-64 oz (6-8 cups) per day. Told pt okay add lemon/lime to water or can flavor with cucumber slice or fresh herbs/fruit for example to naturally flavor water.     Answered patient's questions. Patient verbalized understanding of education provided. Provided pt with RD contact information and list of goals below.     Educational Materials Provided:  [employer] Daily Nutrition Plate method handout     Goals:    1. Can use the Plate method plan for general guidance on getting balanced meals and general portion sizes which is as follows:   Make half of your plate vegetables and fruit. (Aim for eating at least 1-2 cups of vegetables at meals at least and can have additional vegetables servings and fruit too).     Make 1/4 of your plate lean protein sources at a meal (salmon/fish, skinless chicken/turkey breast, pork loin, lean cuts of beef, black or kidney or ladd beans, tofu, edamame, tempeh, eggs, egg whites, lowfat cottage cheese, lowfat yogurt).     Make the other 1/4 of your plate whole grain starches/grains/starchy vegetables at meals. Some examples include quinoa, brown rice, wild rice, barley, whole grain tortilla or starchy vegetables (potatoes, sweet potatoes, winter squash, peas, corn).     Include some healthy/unsaturated fat servings at a meal (avocados, nut butters, nuts/seeds, olive oil, vegetable oils, flaxseed, rosa seeds).     *Note: Recommendation is to eat at least 2-3 serving per day of some good sources of calcium (such as lowfat cottage cheese, lowfat yogurt, lowfat milk, tofu, salmon, sardines, kale, spinach, soybeans, almonds or whey protein powder for example).    2. Planning out meals ahead of time and preparing some meals ahead of time can help you stick to eating more balanced meals. (can wash and cut up vegetable and store in the refrigerator to easily  steam/roast or microwave them to add to dinner meal. Can also heat up frozen vegetables or have a ready washed salad mix to have along with your dinner/pasta meals or add vegetables into your pasta sauce along with some protein. (can make extra servings of chicken or fish to have along with pasta.    3. Drink at least 48-64 oz (6-8 cups) unsweetened fluids per day. Can switch to propel zero, water, caffeine free herbal tea.    4.  Instead of sweetened creamer in coffee, can try adding a sugar free protein drink such as Premier protein drink or Pure protein or Ensure Max into coffee as your creamer. Can also replace a meal with a protein drink and some fruit and vegetables instead of skipping a meal such as lunch (or breakfast. If having a protein drink in your coffee, this can be a meal replacement too).    5. Keep daily food and beverage journals to help give you a daily picture of what you are eating and drinking daily. Journals can be very helpful while trying to lose weight. Can use an julio such as Spongecell Pal or Lose It or can keep in a notebook if you prefer.    6. At night, can have some caffeine free herbal tea or 1/2-1 protein drink (such as premier protein or pure protein or any sugar free/low sugar, lower calorie protein drink to see if this helps limit snacking at night.    Nutrition Monitoring and Evaluation: Will monitor adherence to nutrition recommendations at future RD visits.     Further Medical Nutrition Therapy:  Annual visits    Patient was encouraged to call/contact RD with any further questions.    Bridget Contreras, MS, RD, LD

## 2023-02-21 LAB
BUN SERPL-MCNC: 15.7 MG/DL (ref 6–20)
EXPTIME-PRE: 5.99 SEC
FEF2575-%PRED-PRE: 95 %
FEF2575-PRE: 3.72 L/SEC
FEF2575-PRED: 3.9 L/SEC
FEFMAX-%PRED-PRE: 80 %
FEFMAX-PRE: 9.09 L/SEC
FEFMAX-PRED: 11.24 L/SEC
FEV1-%PRED-PRE: 78 %
FEV1-PRE: 3.47 L
FEV1FEV6-PRE: 84 %
FEV1FEV6-PRED: 80 %
FEV1FVC-PRE: 84 %
FEV1FVC-PRED: 79 %
FIFMAX-PRE: 5.52 L/SEC
FVC-%PRED-PRE: 73 %
FVC-PRE: 4.14 L
FVC-PRED: 5.6 L
HBV SURFACE AB SERPL IA-ACNC: 0.31 M[IU]/ML
HBV SURFACE AB SERPL IA-ACNC: NONREACTIVE M[IU]/ML
HBV SURFACE AG SERPL QL IA: NONREACTIVE

## 2023-02-21 NOTE — PROGRESS NOTES
History and Physical Examination     SUBJECTIVE: Chief concern: preventive health review.     Past Medical History:  1.  History of gout.  2.  Status post laparoscopic cholecystectomy, 2022  3.  History of elevated blood pressure without diagnosis of or treatment for hypertension.  4.  Status post vasectomy,   5.  History of anxiety with panic attack, circa .  6.  GERD  7.  History of vertigo ×2, most recently in 2021; initial evaluation 2013 apparently suggestive of BPPV     Adverse Drug Reactions: None.     Current Medications:  None;  prescriptions for allopurinol, 100 mg per day and apparently colchicine, dose not clear, used as needed     Habits:  Tobacco: Never  Alcohol: 0-1 serving a week  Caffeine: 2 servings of coffee per day  Street drugs: None     Social History:  to gel and father of 4 children: daughter Qiana, age 26, a [employer] graduate who lives in [place], where she works as a  for [employer]; son Zack, age 23, a [employer] graduate who works for the [employer] in [facility]; daughter, Aline, age 21, a senior at [employer] in [place]; and son Timmy, age 14, who enjoys playing baseball.  Hermes is a native of [place] who attended the [employer] on a football scholarship; he played [employer] and completed a degree in English.  Since graduation, Hermes has worked in the [employer] industry, initially in [place], for the past 16 years in [place], and with [employer] in the [place] over the past 6 months.  Away from work, he enjoys spectator sports, especially following his son's baseball team.  He exercises infrequently and sporadically, typically playing basketball or whiffle ball with his son.    Family History: Father is 83, with history of several bouts of pneumonia.  Mother is overweight at age 79.  A sisters 8- and 6 years his senior are in good health.  A brother 5 years his senior has a  history of anxiety and gout.  Children are in good health.  Maternal grandmother  in her 90s.  Maternal grandfather  from complications of coronary artery bypass at age 75.  Paternal grandmother  at age 85, with history of dementia, diagnosed at an advanced age, and depression.  Paternal grandfather  from tobacco-related lung cancer at age 75.    Review of Systems: Intermittent heartburn, improved since cholecystectomy; symptoms are more likely after extended fasting, consumption of spicy foods or when lying down, but at times occur without recalled trigger.  He denies clear association with exertion and notes no associated dyspnea and diaphoresis.  Infrequent episodes of palpitations, typically self-limited and noted during periods of stress.  Intermittent headaches, varying from unilateral retro-orbital to bitemporal to occipital locations.  He: He denies jaw claudication.  Occasional snoring, without  daytime somnolence or known apnea.  History of recurrent flares of itching with use of rubber gloves, triggering diffuse arthralgias; lip redness and swelling after consumption of images from plastic bottles; allergy clinic consultation recommended earlier in day following discussion of these phenomena with his dermatologist.  No history of colonoscopy.  He believes his last tetanus booster was administered more than 10 years ago.  He believes he may have received a hepatitis B vaccination series in the past but cannot be certain.  He received 2 Covid vaccinations, followed by 1 Covid vaccination booster.  No history of zoster vaccination.  Remainder of complete review of systems was negative.    OBJECTIVE:     Vital signs: Height 76 inches.  Weight 246 pounds.  Blood pressure 142/97 on average of 3 automated readings.  Heart rate 72.  Respiratory rate 16.  Temperature 98.4 degrees.  O2 saturation 97% on room air.  General: Alert, neatly dressed and groomed, in no acute distress.  HEENT: Atraumatic  "and normocephalic. Eyelids, pupils, and conjunctivae appeared normal. Lips, teeth and gums appear normal.  Oropharynx showed moist mucous membranes, without exudate or erythema.  \"Crowded\" soft palate with narrow airway.  Neck: Supple, without thyromegaly, mass, or bruit. No cervical or supraclavicular lymphadenopathy.  Large neck circumference.  Back: No spinal or costovertebral angle tenderness.  Chest: Clear to auscultation and percussion. Normal respiratory effort.  Cardiovascular: No jugular venous distention. Regular rate and rhythm, normal S1, S2 without murmur.  Abdomen: Bowel sounds positive; soft, nontender, without rebound, guarding, hepatosplenomegaly or mass.  Extremities: No cyanosis or edema.  Genitalia: Normal male genitalia, without scrotal mass or hernia. No inguinal lymphadenopathy.  Rectal: Normal tone, with smooth, nontender, nonenlarged prostate. No rectal mass.  Skin: Examination was deferred; full evaluation was completed earlier in day through dermatology clinic.  Neurologic: Cranial nerves II-XII were grossly intact. Sensory and motor examinations were normal. Normal gait.  Mini-cog score was 4/5; 5/5 with minimal prompting.  Psychiatric: Alert and oriented ×3. Normal affect. Judgment and insight intact.  PHQ-2 score was 0.  ELIZABETH-7 score was 1.    Creatinine 1.25 (normal range 0.67-1.17), estimated GFR based on creatinine 70, cystatin C 1.1 (normal range 0.67 1.0), GFR Related with cystatin C 71, alkaline phosphatase 77, ALT 44, cholesterol 205, HDL 51, , triglycerides 102, cholesterol/HDL 2.0, glucose 108, PSA 0.65, TSH 3.51, 25-hydroxy vitamin D 20, white blood cell count 6600, hemoglobin 14.7, platelets 248,000, HIV and hepatitis C range nonreactive, urinalysis notable only for small blood, with <1 white blood cell and 2, red blood cells per high-powered field; otherwise, unremarkable.    EKG was unremarkable.  Spirometry showed an FEV1 of 3.47, with an FVC of 4.14; readings were " 78% and 73% of predicted values, respectively.    Audiogram showed normal hearing bilaterally.    Preliminary DEXA results showed normal bone density.  Body composition analysis showed 41.9% fat (100th percentile); body mass index was 29.9.     ASSESSMENT:    1.  Impaired fasting glucose.  We discussed the implications of pre-diabetes, along with importance of weight loss, regular exercise, and modification of diet.  He will be advised of recommended type and frequency of monitoring.    2.  Elevated creatinine level.  GFR is normal measured with both creatinine and cystatin C.  He is a large man but does not exercise regularly to suggest a false positive reading.  We will check BUN and urine albumin/creatinine ratio.  If these results suggest kidney disease, I will suggest initiating treatment for elevated blood pressure.    3.  Elevated blood pressure.  Home readings are not available.  We reviewed non-pharmacologic measures to reduce blood pressure, which I encouraged him to follow.  He will measure and record home blood pressure readings for review with his primary physician 1 month; he agrees to contact me in the event of average readings exceeding 150 systolic or 95 diastolic before his primary M.D. follow-up.    4.  Chest discomfort.  Suggestive of GERD.  In the setting of a history of palpitations, and given his marked deconditioning and sedentary lifestyle, we will proceed with stress echocardiogram to exclude ischemia in anticipation of higher levels of activity.    5.  Abnormal spirometry.  No active respiratory symptoms.  FVC is likely depressed as a result of overweight.  He will work on measures to facilitate weight loss and plan to arrange repeat spirometry in the event of rest her symptoms, or after weight loss.    6.  Headaches.  No temporal artery tenderness on examination.  No sinus symptoms to suggest infection.  We will check ESR to exclude temporal arteritis.  If negative,  I will recommend that  he maintain a symptom journal and consider a trial of acetaminophen/aspirin/caffeine as needed.  We reviewed the importance of screening for obstructive sleep apnea (see below).      7.  Narrow airway with history of snoring and elevated blood pressure.  The scenario suspicious for obstructive sleep apnea.  He denies daytime somnolence, unrefreshed sensation after sleeping, and morning headaches.  We did ask his wife to monitor his breathing during sleep; he will schedule sleep clinic consultation if she observes loud snoring, snorting, or pauses in breathing.    8.  Abnormal urinalysis.  He will repeat a urinalysis after 1 week; if blood remains, he will submit a third urinalysis after at least 1 additional week; further evaluation will be arranged if each of 3 specimens shows evidence of blood.    9.  Overweight.  Associated with high body fat percentage.  We reviewed diet and exercise strategies for facilitating weight loss, building muscle mass, and reducing body fat.    10.  History of gout.  Uric acid level requested; we discussed the potential role of hyperuricemia in kidney disease.  Further recommendations will be based on this result.    11.  Preventive care.  Printed material was provided regarding basic stress management strategies.  I emphasized the importance of scheduling colonoscopy at his earliest convenience.  He was advised to the recombinant zoster vaccination series.  Because he is unsure whether he previously received hepatitis B vaccination series.  We will check serology before proceeding with vaccination.  I emphasized the importance of continuing to receive Covid vaccination boosters.  We reviewed the elements of a healthful diet and the roles of various types of exercise, including the muscle-building benefits of strength training and the fat-burning effect of subthreshold exercise, along with the time economy associated with high-intensity interval training.     PLAN: See above.      ~SRT

## 2023-02-22 LAB
ATRIAL RATE - MUSE: 65 BPM
DIASTOLIC BLOOD PRESSURE - MUSE: NORMAL MMHG
INTERPRETATION ECG - MUSE: NORMAL
P AXIS - MUSE: 57 DEGREES
PR INTERVAL - MUSE: 146 MS
QRS DURATION - MUSE: 88 MS
QT - MUSE: 396 MS
QTC - MUSE: 411 MS
R AXIS - MUSE: 45 DEGREES
SYSTOLIC BLOOD PRESSURE - MUSE: NORMAL MMHG
T AXIS - MUSE: 30 DEGREES
VENTRICULAR RATE- MUSE: 65 BPM

## 2023-03-02 ENCOUNTER — MYC MEDICAL ADVICE (OUTPATIENT)
Dept: INTERNAL MEDICINE | Facility: CLINIC | Age: 53
End: 2023-03-02
Payer: COMMERCIAL

## 2023-03-02 DIAGNOSIS — T88.7XXA MEDICATION SIDE EFFECTS: ICD-10-CM

## 2023-03-02 DIAGNOSIS — E79.0 HYPERURICEMIA: Primary | ICD-10-CM

## 2023-03-02 DIAGNOSIS — I10 HYPERTENSION, UNSPECIFIED TYPE: ICD-10-CM

## 2023-03-07 RX ORDER — COLCHICINE 0.6 MG/1
0.6 TABLET ORAL DAILY
Qty: 90 TABLET | Refills: 0 | Status: SHIPPED | OUTPATIENT
Start: 2023-03-07

## 2023-03-07 RX ORDER — LISINOPRIL 10 MG/1
10 TABLET ORAL DAILY
Qty: 90 TABLET | Refills: 0 | Status: SHIPPED | OUTPATIENT
Start: 2023-03-07

## 2023-03-07 RX ORDER — ALLOPURINOL 100 MG/1
100 TABLET ORAL DAILY
Qty: 90 TABLET | Refills: 0 | Status: SHIPPED | OUTPATIENT
Start: 2023-03-07

## 2023-06-02 DIAGNOSIS — I10 HYPERTENSION, UNSPECIFIED TYPE: ICD-10-CM

## 2023-06-02 DIAGNOSIS — E79.0 HYPERURICEMIA: ICD-10-CM

## 2023-06-07 RX ORDER — ALLOPURINOL 100 MG/1
100 TABLET ORAL DAILY
Qty: 90 TABLET | Status: CANCELLED | OUTPATIENT
Start: 2023-06-07

## 2023-06-07 RX ORDER — LISINOPRIL 10 MG/1
10 TABLET ORAL DAILY
Qty: 90 TABLET | Status: CANCELLED | OUTPATIENT
Start: 2023-06-07

## 2023-06-07 NOTE — TELEPHONE ENCOUNTER
lisinopril (ZESTRIL) 10 MG tablet      Last Written Prescription Date:  3-7-23  Last Fill Quantity: 90,   # refills: 0  Last Office Visit : 2-20-23  Future Office visit:  None    See 3-2-23 My C note    Routing refill request to provider for review/approval because:  Blood pressure out of range   Abnormal Cr  FYI: Overdue K  ( Future order in epic)      allopurinol (ZYLOPRIM) 100 MG tablet      Last Written Prescription Date:  3-7-23  Last Fill Quantity: 90,   # refills: 0    Routing refill request to provider for review/approval because:  Abnormal Cr, Uric acid

## 2024-05-19 ENCOUNTER — HEALTH MAINTENANCE LETTER (OUTPATIENT)
Age: 54
End: 2024-05-19

## 2025-06-08 ENCOUNTER — HEALTH MAINTENANCE LETTER (OUTPATIENT)
Age: 55
End: 2025-06-08

## 2025-06-30 DIAGNOSIS — R79.89 ELEVATED SERUM CREATININE: Primary | ICD-10-CM

## 2025-06-30 DIAGNOSIS — M10.9 GOUT, UNSPECIFIED CAUSE, UNSPECIFIED CHRONICITY, UNSPECIFIED SITE: ICD-10-CM

## 2025-06-30 DIAGNOSIS — T88.7XXA MEDICATION SIDE EFFECTS: ICD-10-CM

## 2025-07-01 ENCOUNTER — ALLIED HEALTH/NURSE VISIT (OUTPATIENT)
Dept: INTERNAL MEDICINE | Facility: CLINIC | Age: 55
End: 2025-07-01

## 2025-07-01 ENCOUNTER — OFFICE VISIT (OUTPATIENT)
Dept: AUDIOLOGY | Facility: CLINIC | Age: 55
End: 2025-07-01

## 2025-07-01 ENCOUNTER — OFFICE VISIT (OUTPATIENT)
Dept: INTERNAL MEDICINE | Facility: CLINIC | Age: 55
End: 2025-07-01

## 2025-07-01 ENCOUNTER — LAB (OUTPATIENT)
Dept: INTERNAL MEDICINE | Facility: CLINIC | Age: 55
End: 2025-07-01

## 2025-07-01 ENCOUNTER — ANCILLARY PROCEDURE (OUTPATIENT)
Dept: BONE DENSITY | Facility: CLINIC | Age: 55
End: 2025-07-01

## 2025-07-01 ENCOUNTER — OFFICE VISIT (OUTPATIENT)
Dept: DERMATOLOGY | Facility: CLINIC | Age: 55
End: 2025-07-01

## 2025-07-01 ENCOUNTER — OFFICE VISIT (OUTPATIENT)
Dept: OPHTHALMOLOGY | Facility: CLINIC | Age: 55
End: 2025-07-01

## 2025-07-01 VITALS
OXYGEN SATURATION: 97 % | SYSTOLIC BLOOD PRESSURE: 117 MMHG | TEMPERATURE: 97.8 F | RESPIRATION RATE: 16 BRPM | HEIGHT: 76 IN | WEIGHT: 253 LBS | BODY MASS INDEX: 30.81 KG/M2 | DIASTOLIC BLOOD PRESSURE: 81 MMHG | HEART RATE: 67 BPM

## 2025-07-01 DIAGNOSIS — L73.8 SENILE SEBACEOUS GLAND HYPERPLASIA: ICD-10-CM

## 2025-07-01 DIAGNOSIS — Z00.00 ENCOUNTER FOR PREVENTIVE HEALTH EXAMINATION: ICD-10-CM

## 2025-07-01 DIAGNOSIS — R01.1 HEART MURMUR: ICD-10-CM

## 2025-07-01 DIAGNOSIS — Z00.00 VISIT FOR PREVENTIVE HEALTH EXAMINATION: ICD-10-CM

## 2025-07-01 DIAGNOSIS — T88.7XXA MEDICATION SIDE EFFECTS: ICD-10-CM

## 2025-07-01 DIAGNOSIS — R82.90 ABNORMAL FINDING ON URINALYSIS: ICD-10-CM

## 2025-07-01 DIAGNOSIS — I10 HYPERTENSION, UNSPECIFIED TYPE: Primary | ICD-10-CM

## 2025-07-01 DIAGNOSIS — L81.8 IDIOPATHIC GUTTATE HYPOMELANOSIS: Primary | ICD-10-CM

## 2025-07-01 DIAGNOSIS — H52.4 PRESBYOPIA OF BOTH EYES: ICD-10-CM

## 2025-07-01 DIAGNOSIS — D22.9 MULTIPLE BENIGN NEVI: ICD-10-CM

## 2025-07-01 DIAGNOSIS — H52.13 MYOPIA, BILATERAL: Primary | ICD-10-CM

## 2025-07-01 DIAGNOSIS — R79.89 ELEVATED SERUM CREATININE: ICD-10-CM

## 2025-07-01 DIAGNOSIS — L82.0 INFLAMED SEBORRHEIC KERATOSIS: ICD-10-CM

## 2025-07-01 DIAGNOSIS — Z00.00 ENCOUNTER FOR PREVENTIVE HEALTH EXAMINATION: Primary | ICD-10-CM

## 2025-07-01 DIAGNOSIS — L81.4 LENTIGINES: ICD-10-CM

## 2025-07-01 DIAGNOSIS — R73.01 IMPAIRED FASTING GLUCOSE: ICD-10-CM

## 2025-07-01 DIAGNOSIS — M25.541 ARTHRALGIA OF BOTH HANDS: ICD-10-CM

## 2025-07-01 DIAGNOSIS — M25.542 ARTHRALGIA OF BOTH HANDS: ICD-10-CM

## 2025-07-01 DIAGNOSIS — M10.9 GOUT, UNSPECIFIED CAUSE, UNSPECIFIED CHRONICITY, UNSPECIFIED SITE: ICD-10-CM

## 2025-07-01 DIAGNOSIS — E66.3 OVERWEIGHT: ICD-10-CM

## 2025-07-01 LAB
ALBUMIN UR-MCNC: NEGATIVE MG/DL
ALP SERPL-CCNC: 80 U/L (ref 40–150)
ALT SERPL W P-5'-P-CCNC: 24 U/L (ref 0–70)
APPEARANCE UR: CLEAR
ATRIAL RATE - MUSE: 62 BPM
BASOPHILS # BLD AUTO: 0.1 10E3/UL (ref 0–0.2)
BASOPHILS NFR BLD AUTO: 1 %
BILIRUB UR QL STRIP: NEGATIVE
CHOLEST SERPL-MCNC: 197 MG/DL
COLOR UR AUTO: YELLOW
CREAT SERPL-MCNC: 1.28 MG/DL (ref 0.67–1.17)
CREAT UR-MCNC: 329 MG/DL
CRP SERPL-MCNC: 5.9 MG/L
CYSTATIN C (ROCHE): 1.2 MG/L (ref 0.6–1)
DIASTOLIC BLOOD PRESSURE - MUSE: NORMAL MMHG
EGFRCR SERPLBLD CKD-EPI 2021: 67 ML/MIN/1.73M2
EOSINOPHIL # BLD AUTO: 0.3 10E3/UL (ref 0–0.7)
EOSINOPHIL NFR BLD AUTO: 4 %
ERYTHROCYTE [DISTWIDTH] IN BLOOD BY AUTOMATED COUNT: 13 % (ref 10–15)
FASTING STATUS PATIENT QL REPORTED: YES
FASTING STATUS PATIENT QL REPORTED: YES
GFR/BSA.PRED SERPLBLD CYS-BASED-ARV: 63 ML/MIN/1.73M2
GLUCOSE SERPL-MCNC: 106 MG/DL (ref 70–99)
GLUCOSE UR STRIP-MCNC: NEGATIVE MG/DL
HCT VFR BLD AUTO: 42.7 % (ref 40–53)
HDLC SERPL-MCNC: 47 MG/DL
HGB BLD-MCNC: 14.3 G/DL (ref 13.3–17.7)
HGB UR QL STRIP: ABNORMAL
HIV 1+2 AB+HIV1 P24 AG SERPL QL IA: NONREACTIVE
HOLD SPECIMEN: NORMAL
IMM GRANULOCYTES # BLD: 0 10E3/UL
IMM GRANULOCYTES NFR BLD: 0 %
INTERPRETATION ECG - MUSE: NORMAL
KETONES UR STRIP-MCNC: NEGATIVE MG/DL
LDLC SERPL CALC-MCNC: 132 MG/DL
LEUKOCYTE ESTERASE UR QL STRIP: NEGATIVE
LYMPHOCYTES # BLD AUTO: 1.9 10E3/UL (ref 0.8–5.3)
LYMPHOCYTES NFR BLD AUTO: 25 %
MCH RBC QN AUTO: 29.5 PG (ref 26.5–33)
MCHC RBC AUTO-ENTMCNC: 33.5 G/DL (ref 31.5–36.5)
MCV RBC AUTO: 88 FL (ref 78–100)
MICROALBUMIN UR-MCNC: <12 MG/L
MICROALBUMIN/CREAT UR: NORMAL MG/G{CREAT}
MONOCYTES # BLD AUTO: 0.6 10E3/UL (ref 0–1.3)
MONOCYTES NFR BLD AUTO: 7 %
MUCOUS THREADS #/AREA URNS LPF: PRESENT /LPF
NEUTROPHILS # BLD AUTO: 5 10E3/UL (ref 1.6–8.3)
NEUTROPHILS NFR BLD AUTO: 63 %
NITRATE UR QL: NEGATIVE
NONHDLC SERPL-MCNC: 150 MG/DL
NRBC # BLD AUTO: 0 10E3/UL
NRBC BLD AUTO-RTO: 0 /100
P AXIS - MUSE: 63 DEGREES
PH UR STRIP: 5.5 [PH] (ref 5–7)
PLATELET # BLD AUTO: 247 10E3/UL (ref 150–450)
POTASSIUM SERPL-SCNC: 4 MMOL/L (ref 3.4–5.3)
PR INTERVAL - MUSE: 152 MS
PSA SERPL DL<=0.01 NG/ML-MCNC: 0.71 NG/ML (ref 0–3.5)
QRS DURATION - MUSE: 90 MS
QT - MUSE: 414 MS
QTC - MUSE: 420 MS
R AXIS - MUSE: 43 DEGREES
RBC # BLD AUTO: 4.84 10E6/UL (ref 4.4–5.9)
RBC URINE: 1 /HPF
SP GR UR STRIP: 1.03 (ref 1–1.03)
SYSTOLIC BLOOD PRESSURE - MUSE: NORMAL MMHG
T AXIS - MUSE: 26 DEGREES
TRIGL SERPL-MCNC: 89 MG/DL
TSH SERPL DL<=0.005 MIU/L-ACNC: 3.95 UIU/ML (ref 0.3–4.2)
URATE SERPL-MCNC: 7.2 MG/DL (ref 3.4–7)
UROBILINOGEN UR STRIP-MCNC: NORMAL MG/DL
VENTRICULAR RATE- MUSE: 62 BPM
VIT D+METAB SERPL-MCNC: 28 NG/ML (ref 20–50)
WBC # BLD AUTO: 7.9 10E3/UL (ref 4–11)
WBC URINE: 0 /HPF

## 2025-07-01 PROCEDURE — 77080 DXA BONE DENSITY AXIAL: CPT | Performed by: INTERNAL MEDICINE

## 2025-07-01 PROCEDURE — 82570 ASSAY OF URINE CREATININE: CPT | Performed by: INTERNAL MEDICINE

## 2025-07-01 PROCEDURE — 82610 CYSTATIN C: CPT | Performed by: INTERNAL MEDICINE

## 2025-07-01 PROCEDURE — 86200 CCP ANTIBODY: CPT | Performed by: INTERNAL MEDICINE

## 2025-07-01 PROCEDURE — 87389 HIV-1 AG W/HIV-1&-2 AB AG IA: CPT | Performed by: INTERNAL MEDICINE

## 2025-07-01 PROCEDURE — 84156 ASSAY OF PROTEIN URINE: CPT | Performed by: INTERNAL MEDICINE

## 2025-07-01 PROCEDURE — 99000 SPECIMEN HANDLING OFFICE-LAB: CPT | Performed by: PATHOLOGY

## 2025-07-01 PROCEDURE — 86618 LYME DISEASE ANTIBODY: CPT | Performed by: INTERNAL MEDICINE

## 2025-07-01 PROCEDURE — 82306 VITAMIN D 25 HYDROXY: CPT | Performed by: INTERNAL MEDICINE

## 2025-07-01 RX ORDER — LOSARTAN POTASSIUM 25 MG/1
25 TABLET ORAL DAILY
Qty: 90 TABLET | Refills: 0 | Status: SHIPPED | OUTPATIENT
Start: 2025-07-01

## 2025-07-01 ASSESSMENT — REFRACTION_MANIFEST
OS_AXIS: 180
OD_ADD: +1.75
OS_ADD: +1.75
OS_SPHERE: -0.50
OD_SPHERE: -1.25
OD_CYLINDER: SPHERE
OS_CYLINDER: +0.50

## 2025-07-01 ASSESSMENT — TONOMETRY
OD_IOP_MMHG: 17
IOP_METHOD: ICARE
OS_IOP_MMHG: 17

## 2025-07-01 ASSESSMENT — VISUAL ACUITY
OS_SC: 20/30
OS_SC+: +2
OD_SC: 20/50
OD_SC+: -2+2
METHOD: SNELLEN - LINEAR

## 2025-07-01 ASSESSMENT — CUP TO DISC RATIO
OD_RATIO: 0.2
OS_RATIO: 0.2

## 2025-07-01 ASSESSMENT — CONF VISUAL FIELD
OD_INFERIOR_NASAL_RESTRICTION: 0
OS_NORMAL: 1
OS_INFERIOR_TEMPORAL_RESTRICTION: 0
OD_NORMAL: 1
OS_SUPERIOR_NASAL_RESTRICTION: 0
OS_SUPERIOR_TEMPORAL_RESTRICTION: 0
OD_INFERIOR_TEMPORAL_RESTRICTION: 0
OD_SUPERIOR_TEMPORAL_RESTRICTION: 0
METHOD: COUNTING FINGERS
OS_INFERIOR_NASAL_RESTRICTION: 0
OD_SUPERIOR_NASAL_RESTRICTION: 0

## 2025-07-01 ASSESSMENT — EXTERNAL EXAM - LEFT EYE: OS_EXAM: NORMAL

## 2025-07-01 ASSESSMENT — EXTERNAL EXAM - RIGHT EYE: OD_EXAM: NORMAL

## 2025-07-01 ASSESSMENT — REFRACTION_WEARINGRX
OD_SPHERE: -1.50
OD_AXIS: 107
OS_SPHERE: -0.75
SPECS_TYPE: SVL
OD_CYLINDER: +0.25
OS_CYLINDER: SPHERE

## 2025-07-01 ASSESSMENT — PAIN SCALES - GENERAL
PAINLEVEL_OUTOF10: NO PAIN (0)
PAINLEVEL_OUTOF10: NO PAIN (0)

## 2025-07-01 ASSESSMENT — SLIT LAMP EXAM - LIDS
COMMENTS: NORMAL
COMMENTS: NORMAL

## 2025-07-01 NOTE — PROGRESS NOTES
AUDIOLOGY REPORT  [facility]      SUMMARY: Audiology visit completed. See audiogram for results.       RECOMMENDATIONS: Recheck hearing if changes are noted or concerns arise.      Supa Mcgowan. CCC-A  Licensed Audiologist   [place] #[id number]

## 2025-07-01 NOTE — LETTER
7/1/2025       RE: [facility]ack Gleason  [address] [facility] [address]     Dear Colleague,    Thank you for referring your patient, Zack Gleason, to the [facility] [place] [facility]. Please see a copy of my visit note below.    [facility] Dermatology Note    Encounter Date: Jul 1, 2025    Dermatology Problem List:  #Possible ACD; [facility] allergy referral     Major PMHx  -     Social Hx:  ______________________________________    Impression/Plan:  Hermes was seen today for skin check.    Diagnoses and all orders for this visit:    Idiopathic guttate hypomelanosis  Senile sebaceous gland hyperplasia  - benign    Inflamed seborrheic keratosis  -     DESTRUCT BENIGN LESION, UP TO 14  - see procedure note    Lentigines  Multiple benign nevi  - Reviewed the compounding benefits of incremental changes to sun protective behaviors including increased frequency of sunscreen and sun protective clothing like broad brimmed hats and longsleeved UPF containing clothing        Cryotherapy procedure note: After verbal consent and discussion of risks and benefits including but no limited to dyspigmentation/scar, blister, and pain, 1 ISK L gluteal cheek was(were) treated with 1-2mm freeze border for 2 cycles with liquid nitrogen. Post cryotherapy instructions were provided.     Follow-up in 1 year.       Staff Involved:  Staff Only    Jayant Sheehan MD   of Dermatology  Department of Dermatology  [facility]      CC:   Chief Complaint   Patient presents with     Skin Check     FBSE - small spots on face, reports growth on buttocks, white spots on hands that show up after tanning       History of Present Illness:  Mr. Zack Gleason is a 55 year old male who presents as a return patient.    Pt presents today for concerns about spots on trunk and extremities       Labs:      Physical  exam:  Vitals: There were no vitals taken for this visit.  GEN: well developed, well-nourished, in no acute distress, in a pleasant mood.     SKIN: Stearns phototype 1  - Full skin, which includes the head/face, both arms, chest, back, abdomen,both legs, genitalia and/or groin buttocks, digits and/or nails, was examined.  - Flat brown macules and patches in a sun exposed areas on face and extremities  - scattered brown papules on trunk and extremities   - Stuck on brown papules on trunk and extremities   - No other lesions of concern on areas examined.     Past Medical History:   Past Medical History:   Diagnosis Date     Anxiety approximately 5 years ago    I've had 2 panic attacks in the past.  The last one occurring about 5 years ago.     Gastroesophageal reflux disease about 10 years ago    Recently had gall bladder taken out.  Haven't had acid reflux since.  Not sure if that's related or just a coincidence.     Hyperlipidemia 2021    Was put on medicine for 30 days, but never renewed the prescription.     Hypertension 4 years ago    Every time I go to a doctor's appointment I've been told my blood pressure is elevated. Never enough to put me on medicine though.     Past Surgical History:   Procedure Laterality Date     CHOLECYSTECTOMY  6/2022       Social History:   reports that he has never smoked. He has never used smokeless tobacco. He reports current alcohol use. He reports that he does not use drugs.    Family History:  Family History   Problem Relation Age of Onset     Influenza/Pneumonia Father      Heart Disease Maternal Grandfather      Dementia Paternal Grandmother      Cancer Paternal Grandfather      Lung Cancer Paternal Grandfather      Glaucoma No family hx of      Macular Degeneration No family hx of      Melanoma No family hx of      Skin Cancer No family hx of        Medications:  Current Outpatient Medications   Medication Sig Dispense Refill     allopurinol (ZYLOPRIM) 100 MG tablet Take 1  tablet (100 mg) by mouth daily 90 tablet 0     colchicine (COLCYRS) 0.6 MG tablet Take 1 tablet (0.6 mg) by mouth daily Use BID in event of gout flare. 90 tablet 0     lisinopril (ZESTRIL) 10 MG tablet Take 1 tablet (10 mg) by mouth daily 90 tablet 0     triamcinolone (KENALOG) 0.1 % external ointment Apply topically 2 times daily 80 g 3     No Known Allergies                Again, thank you for allowing me to participate in the care of your patient.      Sincerely,    Jayant Sheehan MD

## 2025-07-01 NOTE — LETTER
7/1/2025       RE: Zack Gleason  [address] [facility] [address]     Dear Colleague,    Thank you for referring your patient, Zack Gleason, to the [facility]. Please see a copy of my visit note below.    History and Physical Examination     SUBJECTIVE: Chief concern: preventive health review.     Past Medical History:  1.  History of gout.  2.  Impaired fasting glucose  3.  Hypertension  4.  Status post laparoscopic cholecystectomy, 6/2022  5.  History of anxiety with panic attack, circa 2015.  6.  GERD  7.  History of vertigo ×2, most recently in 1/2021; initial evaluation 2013 apparently suggestive of BPPV  8.  History of snoring and narrow airway  9.  Elevated serum creatinine level; workup incomplete  10.  Status post vasectomy, 2010     Adverse Drug Reactions: Amlodipine (edema)     Current Medications:  Allopurinol, 100 mg daily  Lisinopril, 10 mg daily     Habits:  Tobacco: Never  Alcohol: 0-1 serving a week  Caffeine: 2 servings of coffee per day and frequent servings of bottled water containing sucralose  Cannabis/recreational drugs: None     Social History:  to Bridget and father of 4 children: daughter Qiana, age 28, a [employer] graduate who lives in [place], where she works as a  for [employer]; son Zack, age 25, a [employer] graduate who works in [employer] for the [employer]; daughter, Aline, age 23, a graduate of [employer] in [place] who recently began working for the [employer]; and son Timmy, age 16, who recently completed his sophomore year at [employer], where he played on the varsity baseball team.  Hermes is a native of [place] who attended the [employer] on a football scholarship; he played quarterback and completed a degree in English.  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  Away from work, he enjoys spectator sports, especially following his son's baseball team.  He occasionally plays golf or throws batting practice for his son but does not follow a formal exercise schedule.       Family History: Father is 85, with history of recurrent pneumonia.  Mother is overweight at age 81.  Sisters 8- and 6 years his senior are in good health.  A brother 5 years his senior has a history of anxiety, overweight, dyslipidemia, impaired fasting glucose and gout.  Children are in good health.  Maternal grandmother  in her 90s.  Maternal grandfather  from complications of coronary artery bypass at age 75.  Paternal grandmother  at age 85, with history of dementia, diagnosed at an advanced age, and depression.  Paternal grandfather  from tobacco-related lung cancer at age 75.     Review of Systems: Hermes questions whether he should consider treatment with a GLP-1 RA, on which his brother has lost a substantial amount of weight.  Over the past 6 months, he has noted morning stiffness and an aching sensation in the PIP joints of both hands, symptoms are not noted in his thumbs and are most pronounced in the right fourth finger; discomfort lasts for approximately 2 hours and is not associated with warmth or erythema.  Over the same period of time he has noted a nodular protrusion over the dorsal aspect of the right second PIP joint and a bony prominence over the anterolateral aspect of the left patella.  Neither prominence is associated with warmth, erythema, or pain..  Occasional snoring, without  daytime somnolence or known apnea.  Persistent dry cough that improved when he switched from lisinopril to amlodipine; when he resumed treatment with lisinopril after developing edema from amlodipine, his cough recurred.  He denies fever, chills, sweats, purulent sputum production,  "dyspnea, and chest pain.  No history of colonoscopy.  Most recent tetanus (Tdap vaccination was administered in 2/2023.  Hepatitis B serology in 2023 showed that he is not immune..  COVID vaccinations administered in 2/2021, 3/2021, and 12/2021.  No history of pneumococcal or zoster vaccinations.  Remainder of complete review of systems was negative.     OBJECTIVE:     Vital signs: Height 76 inches.  Weight 253 pounds.  Blood pressure 119/82 on average of 3 automated readings.  Heart rate 67.  Respiratory rate 16.  Temperature 97.8 degrees.  O2 saturation 97% on room air.  General: Alert, neatly dressed and groomed, in no acute distress.  HEENT: Atraumatic and normocephalic. Eyelids, pupils, and conjunctivae appeared normal. Lips, teeth and gums appear normal.  Oropharynx showed moist mucous membranes, without exudate or erythema.  \"Crowded\" soft palate with prominent tongue and narrow airway.  Neck: Supple, without thyromegaly, mass, or bruit. No cervical or supraclavicular lymphadenopathy.  Large neck circumference.  Back: No spinal or costovertebral angle tenderness.  Chest: Clear to auscultation and percussion. Normal respiratory effort.  Cardiovascular: No jugular venous distention. Regular rate and rhythm, normal S1, S2 with possible early systolic murmur.  Abdomen: Bowel sounds positive; soft, nontender, without rebound, guarding, hepatosplenomegaly or mass.  Extremities: No cyanosis or edema.  Genitalia: Normal male genitalia, without scrotal mass or hernia. No inguinal lymphadenopathy.  Rectal: Examination was offered but declined  Skin: Examination was deferred; full evaluation was completed earlier in day through dermatology clinic.  Neurologic: Cranial nerves II-XII were grossly intact. Sensory and motor examinations were normal. Normal gait.  Mini-cog score was 5/5.  Psychiatric: Alert and oriented ×3. Normal affect. Judgment and insight intact.  PHQ-2 score was 0.       Potassium 4.0, creatinine 1.28 " (estimated GFR 67), Cystatin C 1.2 (estimated GFR 63), alkaline phosphatase 80, ALT 24, cholesterol 197, triglycerides 89, HDL 47, , cholesterol/HDL 4.2, glucose 106, PSA 0.71, TSH 3.95, uric acid 7.2, 25-hydroxy vitamin D 28, white blood cell count 7900, hemoglobin 14.3, platelets 247,000, urinalysis notable for trace blood with 1 RBC/hpf.  HIV nonreactive.  Urine albumin <12.0 mg/liter.  Urine creatinine 329.0 mg/deciliter.       EKG was unremarkable.  Spirometry showed an FEV1 of 3.27, with an FVC of 3.90; readings were 76% and 71% of predicted values, respectively.     An audiogram showed normal hearing bilaterally.     Preliminary DEXA results showed normal bone density.    Body composition analysis showed 41.0% fat (100th percentile); body mass index was 30.8.  A reading from 2/2023 showed 41.9% fat (100th percentile); body mass index was 29.9.     ASSESSMENT:     1.  Impaired fasting glucose.  We again discussed the implications of pre-diabetes, along with importance of weight loss, regular exercise, and modification of diet.  He was advised to arrange annual measurement of a fasting glucose level.     2.  Elevated creatinine level.  GFR is normal measured with both creatinine and cystatin C.  He is a large man but does not exercise regularly to suggest a false positive reading.  No use of creatine.  History of hyperuricemia with minimally elevated uric acid level at this time.  His history of elevated creatinine levels predated his use of lisinopril.  He agreed to proceed with an E consult with nephrology, with further recommendations based on the outcome.     3.  Hypertension.  Blood pressure controlled on current regimen.  He resumed lisinopril after developing edema on the amlodipine.  Previously he had discontinued lisinopril due to a presumed side effect of cough.  Since resuming lisinopril, his cough has returned.  He will discontinue lisinopril, begin losartan, 25 mg daily, and monitor and  record home blood pressure readings for review with usual physician within 6-8 weeks.  I will recommend measurement of potassium and creatinine levels at that time.     4.  Arthralgias.  Limited to the PIP joints of both hands, without evidence of swelling, warmth or erythema.  Given persistent symptoms upon awakening each morning, we will exclude inflammatory causes with measurement of CRP, Lyme titer, and anti-CCP level.  Further recommendations will be based on these results.  For now, he will use acetaminophen, up to 1000 mg 3 times daily as needed for arthralgias, with the understanding that this should not be used if consuming more than 2 servings of alcohol per day.     5.  Narrow airway with history of snoring, impaired fasting glucose, and elevated blood pressure.  The scenario remains suspicious for obstructive sleep apnea.  He denies daytime somnolence, unrefreshed sensation after sleeping, and morning headaches.  He will again ask his wife to monitor his breathing during sleep; we agreed that he would schedule sleep clinic consultation if she observes loud snoring, snorting, or pauses in breathing.      6.  Overweight.  We reviewed usual diet and exercise strategies for reducing body fat.  Hermes admits that he has lack of motivation to increase his levels of activity.  Given his brothers favorable response to a GLP-1 RA, he expresses interest in pursuing further evaluation through our [facility] Weight [facility], which will be arranged.     7.  Abnormal urinalysis.  He will repeat a urinalysis after 1 week; if blood remains, further evaluation will be arranged.  We will await any additional comments from nephrology E consult.    8.  Marginally abnormal spirometry.  He believes that his symptoms were affected by his cough, which prevents full inspiration and expiration.  If cough does not subside with elimination of lisinopril, he will pursue further medical evaluation.  When cough does  subside, I we will recommend repeat spirometry testing, with further evaluation if abnormalities are noted.     9.  Apparent heart murmur.  Echocardiogram will be arranged for further evaluation.     10.  History of gout.  Uric acid level is minimally elevated.  He was encouraged to continue regular use of allopurinol.  Further input from nephrology pending regarding management of elevated creat tendon.     11.  Right second PIP and left knee patella region nodules.  I suspect the finger nodule is a synovial cyst; the nature of the patella region nodule is unclear.  I recommended elective evaluation through an orthopedic urgent care such as [facility] for further characterization and management.    12.  Preventive care.  Printed material was provided regarding basic stress management strategies.  I emphasized the importance of scheduling colonoscopy at his earliest convenience.  He was advised to schedule PCV 21 vaccination, hepatitis B vaccination series, and recombinant zoster vaccination series, in addition to annual influenza vaccination and COVID vaccination boosters per expert guidelines.  I recommended vitamin D3, 25-50 mcg daily, while maintaining daily calcium intake of 1200 mg, preferably dietary sources.  I will recommend daily use of a multivitamin supplement/taking iron.     PLAN: See above.     ~SRT      Again, thank you for allowing me to participate in the care of your patient.      Sincerely,    Malachi Sotomayor MD

## 2025-07-01 NOTE — PROGRESS NOTES
HPI  Zack Gleason is a 55 year old male here for comprehensive eye exam.    HPI       COMPREHENSIVE EYE EXAM    In both eyes.  This started 2 years ago.  Characterized as blurred vision.  Context:  night driving.  Since onset it is stable.  Associated symptoms include Negative for glare, haloes, eye pain, flashes and floaters.  Treatments tried include no treatments.  Pain was noted as 0/10.             Comments    Yas MARMOLEJO July 1, 2025 9:50 AM          Last edited by Yas Hamm on 7/1/2025  9:50 AM.        Glasses off to read.  Wears distance glasses occasionally.  High computer use.    PMH:   Past Medical History:   Diagnosis Date    Anxiety approximately 5 years ago    I've had 2 panic attacks in the past.  The last one occurring about 5 years ago.    Gastroesophageal reflux disease about 10 years ago    Recently had gall bladder taken out.  Haven't had acid reflux since.  Not sure if that's related or just a coincidence.    Hyperlipidemia 2021    Was put on medicine for 30 days, but never renewed the prescription.    Hypertension 4 years ago    Every time I go to a doctor's appointment I've been told my blood pressure is elevated. Never enough to put me on medicine though.      POH: Glasses for myopia, no surgery, no trauma  Oc Meds: none  FH: Denies any glaucoma, age related macular degeneration, or other known eye diseases         Assessment & Plan        (H52.13) Myopia, bilateral - Both Eyes (primary encounter diagnosis)  Presbyopia - both eyes   Comment: Mild change in prescription.  Retina attached.  Plan: Refraction done and prescription for glasses given.  Distance only versus progressive add lenses discussed.      Take breaks from computer use regularly and use artificial tear drops four times a day as needed during times of high computer use.   -----------------------------------------------------------------------------------       Patient disposition:   Return in about 1  year (around 7/1/2026). Patient to call sooner as needed.    Complete documentation of historical and exam elements from today's encounter can be found in the full encounter summary report (not reduplicated in this progress note). I personally obtained the chief complaint(s) and history of present illness.  I have confirmed and edited as necessary the CC, HPI, PMH/PSH, social history, FMH, ROS, and exam/neuro findings as obtained by the technician or others. I have examined this patient myself and I personally viewed the image(s) and studies listed above and the documentation reflects my findings and interpretation.     Mayuri Zuluaga MD

## 2025-07-01 NOTE — PROGRESS NOTES
[facility] Dermatology Note    Encounter Date: Jul 1, 2025    Dermatology Problem List:  #Possible ACD; C allergy referral     Major PMHx  -     Social Hx:  ______________________________________    Impression/Plan:  Hermes was seen today for skin check.    Diagnoses and all orders for this visit:    Idiopathic guttate hypomelanosis  Senile sebaceous gland hyperplasia  - benign    Inflamed seborrheic keratosis  -     DESTRUCT BENIGN LESION, UP TO 14  - see procedure note    Lentigines  Multiple benign nevi  - Reviewed the compounding benefits of incremental changes to sun protective behaviors including increased frequency of sunscreen and sun protective clothing like broad brimmed hats and longsleeved UPF containing clothing        Cryotherapy procedure note: After verbal consent and discussion of risks and benefits including but no limited to dyspigmentation/scar, blister, and pain, 1 ISK L gluteal cheek was(were) treated with 1-2mm freeze border for 2 cycles with liquid nitrogen. Post cryotherapy instructions were provided.     Follow-up in 1 year.       Staff Involved:  Staff Only    Jayant Sheehan MD   of Dermatology  Department of Dermatology  [facility]      CC:   Chief Complaint   Patient presents with    Skin Check     FBSE - small spots on face, reports growth on buttocks, white spots on hands that show up after tanning       History of Present Illness:  Mr. Zack Gleason is a 55 year old male who presents as a return patient.    Pt presents today for concerns about spots on trunk and extremities       Labs:      Physical exam:  Vitals: There were no vitals taken for this visit.  GEN: well developed, well-nourished, in no acute distress, in a pleasant mood.     SKIN: Stearns phototype 1  - Full skin, which includes the head/face, both arms, chest, back, abdomen,both legs, genitalia and/or groin buttocks, digits and/or nails, was  examined.  - Flat brown macules and patches in a sun exposed areas on face and extremities  - scattered brown papules on trunk and extremities   - Stuck on brown papules on trunk and extremities   - No other lesions of concern on areas examined.     Past Medical History:   Past Medical History:   Diagnosis Date    Anxiety approximately 5 years ago    I've had 2 panic attacks in the past.  The last one occurring about 5 years ago.    Gastroesophageal reflux disease about 10 years ago    Recently had gall bladder taken out.  Haven't had acid reflux since.  Not sure if that's related or just a coincidence.    Hyperlipidemia 2021    Was put on medicine for 30 days, but never renewed the prescription.    Hypertension 4 years ago    Every time I go to a doctor's appointment I've been told my blood pressure is elevated. Never enough to put me on medicine though.     Past Surgical History:   Procedure Laterality Date    CHOLECYSTECTOMY  6/2022       Social History:   reports that he has never smoked. He has never used smokeless tobacco. He reports current alcohol use. He reports that he does not use drugs.    Family History:  Family History   Problem Relation Age of Onset    Influenza/Pneumonia Father     Heart Disease Maternal Grandfather     Dementia Paternal Grandmother     Cancer Paternal Grandfather     Lung Cancer Paternal Grandfather     Glaucoma No family hx of     Macular Degeneration No family hx of     Melanoma No family hx of     Skin Cancer No family hx of        Medications:  Current Outpatient Medications   Medication Sig Dispense Refill    allopurinol (ZYLOPRIM) 100 MG tablet Take 1 tablet (100 mg) by mouth daily 90 tablet 0    colchicine (COLCYRS) 0.6 MG tablet Take 1 tablet (0.6 mg) by mouth daily Use BID in event of gout flare. 90 tablet 0    lisinopril (ZESTRIL) 10 MG tablet Take 1 tablet (10 mg) by mouth daily 90 tablet 0    triamcinolone (KENALOG) 0.1 % external ointment Apply topically 2 times daily  80 g 3     No Known Allergies

## 2025-07-01 NOTE — NURSING NOTE
Dermatology Rooming Note    Zack Gleason's goals for this visit include:   Chief Complaint   Patient presents with    Skin Check     FBSE - small spots on face, reports growth on buttocks, white spots on hands that show up after tanning     DILLON Tavarez

## 2025-07-01 NOTE — PROGRESS NOTES
[facility] Dermatology Note    Encounter Date: Jul 1, 2025    Dermatology Problem List:  1. Suspected ACD, possibly to rubber components in gloves: triamcinolone, dermatoallergology referral (seen by Dr. Mckeon on 2/20/23)  ______________________________________    Impression/Plan:  1. Suspected allergic contact dermatitis, possibly to rubber fingertips in gloves: will start with topicals. We discussed further diagnostic workup with patch testing and will refer to dermatoallergology.  - triamcinolone***  - derm-allergy referral***    2. Reassurance provided for benign lesions not treated today including cherry angiomata and sebaceous hyperplasia.      Follow-up in 1*** year.     Staff and Medical Student Involved:      CC:   No chief complaint on file.      History of Present Illness:  Mr. Zack Gleason is a 55 year old male who presents as a return patient, last seen for suspected ACD by Dr. Mckeon in 2023.     Labs:  N/A    Physical exam:  Vitals: There were no vitals taken for this visit.  GEN: This is a well developed, well-nourished male in no acute distress, in a pleasant mood.    SKIN: Stearns phototype II  - Full skin, which includes the head/face, both arms, chest, back, abdomen,both legs, buttocks, digits and/or nails, was examined.  - There are dome shaped bright red papules on the head/neck, trunk, extremities. ***  - There are yellow oily papules with central umbilication located on the face***  ***  - No other lesions of concern on areas examined.     Past Medical History:   Past Medical History:   Diagnosis Date    Anxiety approximately 5 years ago    I've had 2 panic attacks in the past.  The last one occurring about 5 years ago.    Gastroesophageal reflux disease about 10 years ago    Recently had gall bladder taken out.  Haven't had acid reflux since.  Not sure if that's related or just a coincidence.    Hyperlipidemia 2021    Was put on medicine for 30 days, but never  renewed the prescription.    Hypertension 4 years ago    Every time I go to a doctor's appointment I've been told my blood pressure is elevated. Never enough to put me on medicine though.     Past Surgical History:   Procedure Laterality Date    CHOLECYSTECTOMY  6/2022       Social History:   reports that he has never smoked. He has never used smokeless tobacco. He reports current alcohol use. He reports that he does not use drugs.    Family History:  Family History   Problem Relation Age of Onset    Influenza/Pneumonia Father     Heart Disease Maternal Grandfather     Dementia Paternal Grandmother     Cancer Paternal Grandfather     Lung Cancer Paternal Grandfather     Glaucoma No family hx of     Macular Degeneration No family hx of        Medications:  Current Outpatient Medications   Medication Sig Dispense Refill    allopurinol (ZYLOPRIM) 100 MG tablet Take 1 tablet (100 mg) by mouth daily 90 tablet 0    colchicine (COLCYRS) 0.6 MG tablet Take 1 tablet (0.6 mg) by mouth daily Use BID in event of gout flare. 90 tablet 0    lisinopril (ZESTRIL) 10 MG tablet Take 1 tablet (10 mg) by mouth daily 90 tablet 0    triamcinolone (KENALOG) 0.1 % external ointment Apply topically 2 times daily 80 g 3     No Known Allergies

## 2025-07-01 NOTE — NURSING NOTE
AHA BP    1st    125/85  2nd  116/80  3rd   117/81    Average   119/82  Marta Mendes CMA 7:54 AM on 7/1/2025

## 2025-07-01 NOTE — PROGRESS NOTES
History and Physical Examination     SUBJECTIVE: Chief concern: preventive health review.     Past Medical History:  1.  History of gout.  2.  Impaired fasting glucose  3.  Hypertension  4.  Status post laparoscopic cholecystectomy, 6/2022  5.  History of anxiety with panic attack, circa 2015.  6.  GERD  7.  History of vertigo ×2, most recently in 1/2021; initial evaluation 2013 apparently suggestive of BPPV  8.  History of snoring and narrow airway  9.  Elevated serum creatinine level; workup incomplete  10.  Status post vasectomy, 2010     Adverse Drug Reactions: Amlodipine (edema)     Current Medications:  Allopurinol, 100 mg daily  Lisinopril, 10 mg daily     Habits:  Tobacco: Never  Alcohol: 0-1 serving a week  Caffeine: 2 servings of coffee per day and frequent servings of bottled water containing sucralose  Cannabis/recreational drugs: None     Social History:  to Bridget and father of 4 children: daughter Qiana, age 28, a [employer] graduate who lives in [place], where she works as a  for [employer]; son Zack, age 25, a [employer] graduate who works in [employer] for the [employer]; daughter, Aline, age 23, a graduate of [employer] in [place] who recently began working for the [employer]; and son Timmy, age 16, who recently completed his sophomore year at [employer], where he played on the [employer] baseball team.  Hermes is a native of [place] who attended the [employer] on a football scholarship; he played quarterback and completed a degree in English.  Since graduation, Hermes has worked in the [employer] industry, initially in [place], then for 16 years in [place], and with [employer] in the [facility] since late 2022.  Away from work, he enjoys spectator sports, especially following his son's baseball team.  He occasionally plays golf or throws batting practice for his son but does not follow a formal exercise schedule.        Family History: Father is 85, with history of recurrent pneumonia.  Mother is overweight at age 81.  Sisters 8- and 6 years his senior are in good health.  A brother 5 years his senior has a history of anxiety, overweight, dyslipidemia, impaired fasting glucose and gout.  Children are in good health.  Maternal grandmother  in her 90s.  Maternal grandfather  from complications of coronary artery bypass at age 75.  Paternal grandmother  at age 85, with history of dementia, diagnosed at an advanced age, and depression.  Paternal grandfather  from tobacco-related lung cancer at age 75.     Review of Systems: Hermes questions whether he should consider treatment with a GLP-1 RA, on which his brother has lost a substantial amount of weight.  Over the past 6 months, he has noted morning stiffness and an aching sensation in the PIP joints of both hands, symptoms are not noted in his thumbs and are most pronounced in the right fourth finger; discomfort lasts for approximately 2 hours and is not associated with warmth or erythema.  Over the same period of time he has noted a nodular protrusion over the dorsal aspect of the right second PIP joint and a bony prominence over the anterolateral aspect of the left patella.  Neither prominence is associated with warmth, erythema, or pain..  Occasional snoring, without  daytime somnolence or known apnea.  Persistent dry cough that improved when he switched from lisinopril to amlodipine; when he resumed treatment with lisinopril after developing edema from amlodipine, his cough recurred.  He denies fever, chills, sweats, purulent sputum production, dyspnea, and chest pain.  No history of colonoscopy.  Most recent tetanus (Tdap vaccination was administered in 2023.  Hepatitis B serology in  showed that he is not immune..  COVID vaccinations administered in 2021, 3/2021, and 2021.  No history of pneumococcal or zoster vaccinations.  Remainder of complete  "review of systems was negative.     OBJECTIVE:     Vital signs: Height 76 inches.  Weight 253 pounds.  Blood pressure 119/82 on average of 3 automated readings.  Heart rate 67.  Respiratory rate 16.  Temperature 97.8 degrees.  O2 saturation 97% on room air.  General: Alert, neatly dressed and groomed, in no acute distress.  HEENT: Atraumatic and normocephalic. Eyelids, pupils, and conjunctivae appeared normal. Lips, teeth and gums appear normal.  Oropharynx showed moist mucous membranes, without exudate or erythema.  \"Crowded\" soft palate with prominent tongue and narrow airway.  Neck: Supple, without thyromegaly, mass, or bruit. No cervical or supraclavicular lymphadenopathy.  Large neck circumference.  Back: No spinal or costovertebral angle tenderness.  Chest: Clear to auscultation and percussion. Normal respiratory effort.  Cardiovascular: No jugular venous distention. Regular rate and rhythm, normal S1, S2 with possible early systolic murmur.  Abdomen: Bowel sounds positive; soft, nontender, without rebound, guarding, hepatosplenomegaly or mass.  Extremities: No cyanosis or edema.  Genitalia: Normal male genitalia, without scrotal mass or hernia. No inguinal lymphadenopathy.  Rectal: Examination was offered but declined  Skin: Examination was deferred; full evaluation was completed earlier in day through dermatology clinic.  Neurologic: Cranial nerves II-XII were grossly intact. Sensory and motor examinations were normal. Normal gait.  Mini-cog score was 5/5.  Psychiatric: Alert and oriented ×3. Normal affect. Judgment and insight intact.  PHQ-2 score was 0.       Potassium 4.0, creatinine 1.28 (estimated GFR 67), Cystatin C 1.2 (estimated GFR 63), alkaline phosphatase 80, ALT 24, cholesterol 197, triglycerides 89, HDL 47, , cholesterol/HDL 4.2, glucose 106, PSA 0.71, TSH 3.95, uric acid 7.2, 25-hydroxy vitamin D 28, white blood cell count 7900, hemoglobin 14.3, platelets 247,000, urinalysis notable for " trace blood with 1 RBC/hpf.  HIV nonreactive.  Urine albumin <12.0 mg/liter.  Urine creatinine 329.0 mg/deciliter.       EKG was unremarkable.  Spirometry showed an FEV1 of 3.27, with an FVC of 3.90; readings were 76% and 71% of predicted values, respectively.     An audiogram showed normal hearing bilaterally.     Preliminary DEXA results showed normal bone density.    Body composition analysis showed 41.0% fat (100th percentile); body mass index was 30.8.  A reading from 2/2023 showed 41.9% fat (100th percentile); body mass index was 29.9.     ASSESSMENT:     1.  Impaired fasting glucose.  We again discussed the implications of pre-diabetes, along with importance of weight loss, regular exercise, and modification of diet.  He was advised to arrange annual measurement of a fasting glucose level.     2.  Elevated creatinine level.  GFR is normal measured with both creatinine and cystatin C.  He is a large man but does not exercise regularly to suggest a false positive reading.  No use of creatine.  History of hyperuricemia with minimally elevated uric acid level at this time.  His history of elevated creatinine levels predated his use of lisinopril.  He agreed to proceed with an E consult with nephrology, with further recommendations based on the outcome.     3.  Hypertension.  Blood pressure controlled on current regimen.  He resumed lisinopril after developing edema on the amlodipine.  Previously he had discontinued lisinopril due to a presumed side effect of cough.  Since resuming lisinopril, his cough has returned.  He will discontinue lisinopril, begin losartan, 25 mg daily, and monitor and record home blood pressure readings for review with usual physician within 6-8 weeks.  I will recommend measurement of potassium and creatinine levels at that time.     4.  Arthralgias.  Limited to the PIP joints of both hands, without evidence of swelling, warmth or erythema.  Given persistent symptoms upon awakening each  morning, we will exclude inflammatory causes with measurement of CRP, Lyme titer, and anti-CCP level.  Further recommendations will be based on these results.  For now, he will use acetaminophen, up to 1000 mg 3 times daily as needed for arthralgias, with the understanding that this should not be used if consuming more than 2 servings of alcohol per day.     5.  Narrow airway with history of snoring, impaired fasting glucose, and elevated blood pressure.  The scenario remains suspicious for obstructive sleep apnea.  He denies daytime somnolence, unrefreshed sensation after sleeping, and morning headaches.  He will again ask his wife to monitor his breathing during sleep; we agreed that he would schedule sleep clinic consultation if she observes loud snoring, snorting, or pauses in breathing.      6.  Overweight.  We reviewed usual diet and exercise strategies for reducing body fat.  Hermes admits that he has lack of motivation to increase his levels of activity.  Given his brothers favorable response to a GLP-1 RA, he expresses interest in pursuing further evaluation through our [facility] Weight [facility], which will be arranged.     7.  Abnormal urinalysis.  He will repeat a urinalysis after 1 week; if blood remains, further evaluation will be arranged.  We will await any additional comments from nephrology E consult.    8.  Marginally abnormal spirometry.  He believes that his symptoms were affected by his cough, which prevents full inspiration and expiration.  If cough does not subside with elimination of lisinopril, he will pursue further medical evaluation.  When cough does subside, I we will recommend repeat spirometry testing, with further evaluation if abnormalities are noted.     9.  Apparent heart murmur.  Echocardiogram will be arranged for further evaluation.     10.  History of gout.  Uric acid level is minimally elevated.  He was encouraged to continue regular use of allopurinol.  Further  input from nephrology pending regarding management of elevated creat tendon.     11.  Right second PIP and left knee patella region nodules.  I suspect the finger nodule is a synovial cyst; the nature of the patella region nodule is unclear.  I recommended elective evaluation through an orthopedic urgent care such as [facility] for further characterization and management.    12.  Preventive care.  Printed material was provided regarding basic stress management strategies.  I emphasized the importance of scheduling colonoscopy at his earliest convenience.  He was advised to schedule PCV 21 vaccination, hepatitis B vaccination series, and recombinant zoster vaccination series, in addition to annual influenza vaccination and COVID vaccination boosters per expert guidelines.  I recommended vitamin D3, 25-50 mcg daily, while maintaining daily calcium intake of 1200 mg, preferably dietary sources.  I will recommend daily use of a multivitamin supplement/taking iron.     PLAN: See above.     ~SRT

## 2025-07-01 NOTE — NURSING NOTE
Chief Complaint   Patient presents with    Physical     Patient is here for annual physical     Marta Mendes CMA 7:45 AM on 7/1/2025

## 2025-07-01 NOTE — PROGRESS NOTES
Zack Gleason comes into clinic today at the request of Dr. Malachi Sotomayor Ordering Provider for EKG.    This service provided today was under the supervising provider of the day Dr. Malachi Sotomayor, who was available if needed.    Marta Hung, [facility]

## 2025-07-02 ENCOUNTER — E-CONSULT (OUTPATIENT)
Dept: MULTI SPECIALTY CLINIC | Facility: CLINIC | Age: 55
End: 2025-07-02
Payer: COMMERCIAL

## 2025-07-02 ENCOUNTER — PATIENT OUTREACH (OUTPATIENT)
Dept: CARE COORDINATION | Facility: CLINIC | Age: 55
End: 2025-07-02

## 2025-07-02 DIAGNOSIS — R79.89 ELEVATED SERUM CREATININE: Primary | ICD-10-CM

## 2025-07-02 LAB
ALBUMIN MFR UR ELPH: 12.2 MG/DL
B BURGDOR IGG+IGM SER QL: 0.57
CREAT UR-MCNC: 326 MG/DL
EXPTIME-PRE: 6.83 SEC
FEF2575-%PRED-POST: 113 %
FEF2575-%PRED-PRE: 99 %
FEF2575-POST: 4.19 L/SEC
FEF2575-PRE: 3.68 L/SEC
FEF2575-PRED: 3.69 L/SEC
FEFMAX-%PRED-PRE: 85 %
FEFMAX-PRE: 9.49 L/SEC
FEFMAX-PRED: 11.07 L/SEC
FEV1-%PRED-PRE: 76 %
FEV1-PRE: 3.27 L
FEV1FEV6-PRE: 84 %
FEV1FEV6-PRED: 80 %
FEV1FVC-PRE: 84 %
FEV1FVC-PRED: 78 %
FIFMAX-PRE: 6.09 L/SEC
FVC-%PRED-PRE: 71 %
FVC-PRE: 3.9 L
FVC-PRED: 5.49 L
PROT/CREAT 24H UR: 0.04 MG/MG CR (ref 0–0.2)

## 2025-07-02 NOTE — PROGRESS NOTES
"    7/2/2025     E-Consult has been accepted.    Interprofessional consultation requested by:  Malachi Sotomayor MD      Clinical Question/Purpose: MY CLINICAL QUESTION IS: Please advise on management of elevated creatinine and cystatin C in patient with impaired fasting glucose and minimal hyperuricemia (7.2) on allopurinol.  His elevated creatinine level antedated lisinopril treatment (BP now controlled); now switching to losartan due to cough.  No creatine use.  Normal PSA and urine albumin and creatinine. Trace blood without RBCs on UA.  Thank you!    Patient assessment and information reviewed: Lab history reviewed; history tab and problem list reviewed; med list, vitals, and recent notes reviewed.     Patient with creatinine 1.05 mg/dL in 6/2022 (appears to be around time of cholecystectomy). Subsequent creatinine checks have all been 1.25-1.3 mg/dL. Cystatin C given similar eGFR to creatinine-based eGFR.  UA repeatedly with 'small' or 'trace' blood. No elevated albuminuria on UACR.   HTN managed on losartan (25mg daily).   Gout managed on allopurinol. Uric acid level recently 7.2.    Recommendations:   Patient with mild creatinine elevation, not fitting definition of CKD at this time (eGFR >=60, so requires proteinuria or albuminuria or structure issue). Creatinine stable over last 2 years, reassuringly.     Check random urine protein (UPCR). If elevated ratio (given no albuminuria on UACR), then order SPEP, serum immunofixation, UPEP, random urine immunofixation, and serum \"kappa and lambda\" (I.e., free light chains, and  the ratio between the two) and refer to nephrology in such a case.     Check renal U/S (no doppler needed at this time - low suspicion for renal artery stenosis given lack of significant creatinine increase, per report of ordering provider, when the ACEi/ARB was started). If obstruction is observed, Urology referral would be appropriate. Other findings, depending on the nature " of the finding, could indicate a need for a Nephrology or Urology referral as well.     Continue to follow BMP/CMP at least once annually.     Discuss if significant NSAID use is occurring, and if so discourage heavy NSAID use.    Goal BP I would recommend is <130/80. Given gout, goal uric acid I would recommend (and titrate allopurinol dose upward and adapt diet to achieve) is <6. Losartan may help treat gout as well (as losartan causes some uric acid excretion in urine).     Given hematuria on UAs x2, I would recommend Urology referral, or eConsult if deemed appropriate, unless his UPCR comes back elevated (in which case that would raise my otherwise low suspicion for glomerular source of blood).         The recommendations provided in this E-Consult are based on a review of clinical data pertinent to the clinical question presented, without a review of the patient's complete medical record or, the benefit of a comprehensive in-person or virtual patient evaluation. This consultation should not replace the clinical judgement and evaluation of the provider ordering this E-Consult. Any new clinical issues, or changes in patient status since the filing of this E-Consult will need to be taken into account when assessing these recommendations. Please contact me if you have further questions.    My total time spent reviewing clinical information and formulating assessment was 15 minutes.        Ramy Sierra MD  Nephrology

## 2025-07-03 DIAGNOSIS — R31.29 MICROSCOPIC HEMATURIA: Primary | ICD-10-CM

## 2025-07-05 ENCOUNTER — PATIENT OUTREACH (OUTPATIENT)
Dept: CARE COORDINATION | Facility: CLINIC | Age: 55
End: 2025-07-05
Payer: COMMERCIAL

## 2025-07-07 ENCOUNTER — PATIENT OUTREACH (OUTPATIENT)
Dept: CARE COORDINATION | Facility: CLINIC | Age: 55
End: 2025-07-07
Payer: COMMERCIAL

## 2025-07-07 LAB — CCP AB SER IA-ACNC: 0.5 U/ML

## (undated) RX ORDER — ALBUTEROL SULFATE 0.83 MG/ML
SOLUTION RESPIRATORY (INHALATION)
Status: DISPENSED
Start: 2025-07-01